# Patient Record
Sex: FEMALE | Race: WHITE
[De-identification: names, ages, dates, MRNs, and addresses within clinical notes are randomized per-mention and may not be internally consistent; named-entity substitution may affect disease eponyms.]

---

## 2017-01-12 ENCOUNTER — HOSPITAL ENCOUNTER (EMERGENCY)
Dept: HOSPITAL 47 - EC | Age: 77
Discharge: HOME | End: 2017-01-12
Payer: COMMERCIAL

## 2017-01-12 VITALS — DIASTOLIC BLOOD PRESSURE: 88 MMHG | RESPIRATION RATE: 16 BRPM | HEART RATE: 70 BPM | SYSTOLIC BLOOD PRESSURE: 153 MMHG

## 2017-01-12 VITALS — TEMPERATURE: 97.4 F

## 2017-01-12 DIAGNOSIS — I10: ICD-10-CM

## 2017-01-12 DIAGNOSIS — Z86.73: ICD-10-CM

## 2017-01-12 DIAGNOSIS — J45.909: ICD-10-CM

## 2017-01-12 DIAGNOSIS — I25.10: ICD-10-CM

## 2017-01-12 DIAGNOSIS — M32.9: ICD-10-CM

## 2017-01-12 DIAGNOSIS — J44.1: Primary | ICD-10-CM

## 2017-01-12 DIAGNOSIS — I48.91: ICD-10-CM

## 2017-01-12 DIAGNOSIS — Z88.0: ICD-10-CM

## 2017-01-12 DIAGNOSIS — Z88.5: ICD-10-CM

## 2017-01-12 DIAGNOSIS — Z91.040: ICD-10-CM

## 2017-01-12 DIAGNOSIS — E78.5: ICD-10-CM

## 2017-01-12 DIAGNOSIS — Z79.01: ICD-10-CM

## 2017-01-12 DIAGNOSIS — Z79.899: ICD-10-CM

## 2017-01-12 DIAGNOSIS — F41.9: ICD-10-CM

## 2017-01-12 DIAGNOSIS — E07.9: ICD-10-CM

## 2017-01-12 DIAGNOSIS — Z88.1: ICD-10-CM

## 2017-01-12 DIAGNOSIS — F32.9: ICD-10-CM

## 2017-01-12 DIAGNOSIS — I25.2: ICD-10-CM

## 2017-01-12 DIAGNOSIS — Z95.5: ICD-10-CM

## 2017-01-12 LAB
ALP SERPL-CCNC: 86 U/L (ref 38–126)
ALT SERPL-CCNC: 32 U/L (ref 9–52)
ANION GAP SERPL CALC-SCNC: 14 MMOL/L
AST SERPL-CCNC: 25 U/L (ref 14–36)
BASOPHILS # BLD AUTO: 0 K/UL (ref 0–0.2)
BASOPHILS NFR BLD AUTO: 0 %
BUN SERPL-SCNC: 15 MG/DL (ref 7–17)
CALCIUM SPEC-MCNC: 9.4 MG/DL (ref 8.4–10.2)
CH: 31.1
CHCM: 32.1
CHLORIDE SERPL-SCNC: 110 MMOL/L (ref 98–107)
CO2 BLDA-SCNC: 19 MMOL/L (ref 19–24)
CO2 SERPL-SCNC: 21 MMOL/L (ref 22–30)
EOSINOPHIL # BLD AUTO: 0.1 K/UL (ref 0–0.7)
EOSINOPHIL NFR BLD AUTO: 0 %
ERYTHROCYTE [DISTWIDTH] IN BLOOD BY AUTOMATED COUNT: 5.12 M/UL (ref 3.8–5.4)
ERYTHROCYTE [DISTWIDTH] IN BLOOD: 14.7 % (ref 11.5–15.5)
GLUCOSE SERPL-MCNC: 113 MG/DL (ref 74–99)
HCO3 BLDA-SCNC: 18 MMOL/L (ref 21–25)
HCT VFR BLD AUTO: 49.9 % (ref 34–46)
HDW: 2.54
HGB BLD-MCNC: 15.6 GM/DL (ref 11.4–16)
LUC NFR BLD AUTO: 1 %
LYMPHOCYTES # SPEC AUTO: 1.9 K/UL (ref 1–4.8)
LYMPHOCYTES NFR SPEC AUTO: 17 %
MAGNESIUM SPEC-SCNC: 1.9 MG/DL (ref 1.6–2.3)
MCH RBC QN AUTO: 30.4 PG (ref 25–35)
MCHC RBC AUTO-ENTMCNC: 31.3 G/DL (ref 31–37)
MCV RBC AUTO: 97.4 FL (ref 80–100)
MONOCYTES # BLD AUTO: 0.9 K/UL (ref 0–1)
MONOCYTES NFR BLD AUTO: 8 %
NEUTROPHILS # BLD AUTO: 8.1 K/UL (ref 1.3–7.7)
NEUTROPHILS NFR BLD AUTO: 73 %
NON-AFRICAN AMERICAN GFR(MDRD): 48
PCO2 BLDA: 28 MMHG (ref 35–45)
PH BLDA: 7.41 [PH] (ref 7.35–7.45)
PO2 BLDA: 95 MMHG (ref 83–108)
POTASSIUM SERPL-SCNC: 4 MMOL/L (ref 3.5–5.1)
PROT SERPL-MCNC: 6.6 G/DL (ref 6.3–8.2)
SODIUM SERPL-SCNC: 145 MMOL/L (ref 137–145)
WBC # BLD AUTO: 0.12 10*3/UL
WBC # BLD AUTO: 11.1 K/UL (ref 3.8–10.6)
WBC (PEROX): 11.35

## 2017-01-12 PROCEDURE — 99285 EMERGENCY DEPT VISIT HI MDM: CPT

## 2017-01-12 PROCEDURE — 83735 ASSAY OF MAGNESIUM: CPT

## 2017-01-12 PROCEDURE — 36415 COLL VENOUS BLD VENIPUNCTURE: CPT

## 2017-01-12 PROCEDURE — 85025 COMPLETE CBC W/AUTO DIFF WBC: CPT

## 2017-01-12 PROCEDURE — 83605 ASSAY OF LACTIC ACID: CPT

## 2017-01-12 PROCEDURE — 36600 WITHDRAWAL OF ARTERIAL BLOOD: CPT

## 2017-01-12 PROCEDURE — 80053 COMPREHEN METABOLIC PANEL: CPT

## 2017-01-12 PROCEDURE — 71010: CPT

## 2017-01-12 PROCEDURE — 84484 ASSAY OF TROPONIN QUANT: CPT

## 2017-01-12 PROCEDURE — 82805 BLOOD GASES W/O2 SATURATION: CPT

## 2017-01-12 NOTE — ED
Weakness HPI





- General


Chief complaint: Weakness


Stated complaint: Weakness


Time Seen by Provider: 17 13:11


Source: patient, RN notes reviewed


Mode of arrival: ambulatory


Limitations: no limitations





- History of Present Illness


Initial comments: 





Patient's a 76-year-old woman with history of COPD who presents to be evaluated 

for generalized fatigue, dyspnea, poor appetite, and her family also notes that 

she had been having very depressed mood over the prior couple of days.  They do 

acknowledge that her mood seems to have improved here.   The patient does admit 

some depression but denies suicidality.  Family was also concerned because at 

one point yesterday they had noted she was attempting to light a cigarette 

using a bottle And they were concerned she may been hallucinating.  She has not 

been hallucinating today.  Patient denies fever or chills.  She denies change 

in sputum.  Patient denies chest pain, change in urination, leg pain or 

swelling.


MD Complaint: generalized weakness, lack of energy, difficulty walking


-: days(s)


Location: generalized


Improves with: none


Worsens with: none


Associated Symptoms: loss of appetite, shortness of breath





- Related Data


 Home Medications











 Medication  Instructions  Recorded  Confirmed


 


Sertraline HCl [Zoloft] 200 mg PO DAILY 02/17/15 01/12/17


 


Levothyroxine Sodium [Synthroid] 75 mcg PO DAILY 04/28/15 01/12/17


 


Montelukast [Singulair] 10 mg PO HS 04/28/15 01/12/17


 


Acetaminophen Tab [Tylenol Tab] 500 mg PO Q4H PRN 05/05/15 01/12/17


 


Cyclobenzaprine [Flexeril] 10 mg PO HS 10/13/15 01/12/17


 


Metoprolol Tartrate [Lopressor] 25 mg PO BID 10/13/15 01/12/17


 


Sennosides-Docusate Sodium 1 tab PO HS PRN 10/13/15 01/12/17





[Senokot-S]   


 


Warfarin [Coumadin] 2.5 mg PO HS 10/13/15 01/12/17


 


Atorvastatin [Lipitor] 40 mg PO HS 16


 


Gabapentin [Neurontin] 1,200 mg PO BID 16


 


Ipratropium-Albuterol Nebulize 3 ml INHALATION QID PRN 16





[Duoneb 0.5 mg-3 mg/3 ml Soln]   


 


Multivitamins, Thera [Multivitamin] 1 tab PO DAILY 16


 


Nitroglycerin Sl Tabs [Nitrostat] 0.4 mg SUBLINGUAL Q5M PRN 16








 Previous Rx's











 Medication  Instructions  Recorded


 


predniSONE 60 mg PO DAILY #30 tab 17











 Allergies











Allergy/AdvReac Type Severity Reaction Status Date / Time


 


iodine Allergy Severe throat Verified 17 13:37





   Swelling  


 


latex Allergy Severe throat Verified 17 13:37





   swelling  


 


codeine Allergy  Unknown Verified 17 13:37


 


Penicillins Allergy  Rash/Hives Verified 17 13:37


 


doxycycline AdvReac  Nausea & Verified 17 13:37





   Vomiting  














Review of Systems


ROS Statement: 


Those systems with pertinent positive or pertinent negative responses have been 

documented in the HPI.





ROS Other: All systems not noted in ROS Statement are negative.


Constitutional: Reports: weakness (Generalized).  Denies: fever, chills


Respiratory: Reports: cough, dyspnea, wheezes.  Denies: hemoptysis


Cardiovascular: Reports: dyspnea on exertion.  Denies: chest pain, palpitations

, orthopnea, edema, syncope


Gastrointestinal: Denies: abdominal pain, nausea, vomiting, melena


Genitourinary: Denies: dysuria, hematuria


Musculoskeletal: Denies: back pain


Skin: Denies: rash


Neurological: Reports: as per HPI, weakness (Generalized), confusion.  Denies: 

headache, numbness





Past Medical History


Past Medical History: Atrial Fibrillation, Asthma, Coronary Artery Disease (CAD)

, Chest Pain / Angina, COPD, CVA/TIA, Hyperlipidemia, Hypertension, Myocardial 

Infarction (MI), Osteoarthritis (OA), Thyroid Disorder, Vascular Disorder


Additional Past Medical History / Comment(s): LUPUS , RIGHT SIDED WEAKNESS and 

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 

she gets "black out" spells and often falls


Last Myocardial Infarction Date:: 2012


History of Any Multi-Drug Resistant Organisms: None Reported


Past Surgical History: Appendectomy, Heart Catheterization With Stent, 

Orthopedic Surgery, Tonsillectomy


Additional Past Surgical History / Comment(s): 3 cardiac stents, PTCA   tremaine 

carotid endarterectomy, laparoscopy-adhesions removed. tremaine arthroscopic knee 

surgery, bilateral cataract removal,


Past Anesthesia/Blood Transfusion Reactions: No Reported Reaction


Additional Past Anesthesia/Blood Transfusion Reaction / Comment(s): .


Date of Last Stent Placement:: (not sure month)


Past Psychological History: Anxiety


Additional Psychological History / Comment(s): .


Smoking Status: Current some day smoker


Past Alcohol Use History: None Reported


Additional Past Alcohol Use History / Comment(s): started smoking age 30


Past Drug Use History: None Reported





- Past Family History


  ** Father


Family Medical History: Cancer


Additional Family Medical History / Comment(s): Father had massive MI at age 66 

yrs.  He  from CVA at age 79yrs.





  ** Mother


Family Medical History: CVA/TIA, Deep Vein Thrombosis (DVT), Pulmonary Embolus


Additional Family Medical History / Comment(s): Mother  of CVA at age 79 

yrs.





General Exam


Limitations: no limitations


General appearance: alert, in no apparent distress, cachectic


Head exam: Present: atraumatic, normocephalic


Eye exam: Present: normal appearance.  Absent: scleral icterus, conjunctival 

injection


ENT exam: Present: mucous membranes dry


Neck exam: Present: normal inspection


Respiratory exam: Present: wheezes, decreased breath sounds.  Absent: 

respiratory distress, rales, rhonchi, stridor, chest wall tenderness


Cardiovascular Exam: Present: regular rate, normal rhythm, normal heart sounds.

  Absent: systolic murmur, diastolic murmur, rubs, gallop


GI/Abdominal exam: Present: soft.  Absent: distended, tenderness, guarding, 

rebound


Extremities exam: Present: normal inspection, normal capillary refill.  Absent: 

pedal edema, calf tenderness


Back exam: Present: normal inspection.  Absent: CVA tenderness (R), CVA 

tenderness (L)


Neurological exam: Present: alert, oriented X3, CN II-XII intact, normal gait.  

Absent: motor sensory deficit


Psychiatric exam: Present: normal affect


Skin exam: Present: warm, dry, intact.  Absent: cyanosis, diaphoretic, pallor





Course


 Vital Signs











  17





  12:59 16:05


 


Temperature 97.4 F L 


 


Pulse Rate 50 L 70


 


Respiratory 18 16





Rate  


 


Blood Pressure 136/82 153/88


 


O2 Sat by Pulse 100 93 L





Oximetry  














EKG Findings





- EKG Results:


EKG: interpreted by ERMD, sinus rhythm, normal axis, normal QRS, normal ST/T





Medical Decision Making





- Medical Decision Making





Patient is a 76-year-old woman who does appear to be having COPD exacerbation.  

She had treatments and steroids here.  The patient was then feeling better.  I 

discussed admission, given her second visit this month.  The patient states 

that her preference is to go home.  I am concerned about how she is doing but 

will defer at this point to her request to go home, I did phone the 

pulmonologist and she can be seen at 8:30 in the morning.  Discussed return 

parameters and the patient will return should her symptoms recur or if there is 

any worsening in anyway.





- Lab Data


Result diagrams: 


 17 12:41





 17 12:41


 Lab Results











  17 Range/Units





  12:41 12:41 12:41 


 


WBC  11.1 H    (3.8-10.6)  k/uL


 


RBC  5.12    (3.80-5.40)  m/uL


 


Hgb  15.6    (11.4-16.0)  gm/dL


 


Hct  49.9 H    (34.0-46.0)  %


 


MCV  97.4    (80.0-100.0)  fL


 


MCH  30.4    (25.0-35.0)  pg


 


MCHC  31.3    (31.0-37.0)  g/dL


 


RDW  14.7    (11.5-15.5)  %


 


Plt Count  365    (150-450)  k/uL


 


Neutrophils %  73    %


 


Lymphocytes %  17    %


 


Monocytes %  8    %


 


Eosinophils %  0    %


 


Basophils %  0    %


 


Neutrophils #  8.1 H    (1.3-7.7)  k/uL


 


Lymphocytes #  1.9    (1.0-4.8)  k/uL


 


Monocytes #  0.9    (0-1.0)  k/uL


 


Eosinophils #  0.1    (0-0.7)  k/uL


 


Basophils #  0.0    (0-0.2)  k/uL


 


Sample Site     


 


ABG pH     (7.35-7.45)  


 


ABG pCO2     (35-45)  mmHg


 


ABG pO2     ()  mmHg


 


ABG HCO3     (21-25)  mmol/L


 


ABG Total CO2     (19-24)  mmol/L


 


ABG O2 Saturation     (94-97)  %


 


ABG Base Excess     mmol/L


 


FiO2     %


 


Sodium   145   (137-145)  mmol/L


 


Potassium   4.0   (3.5-5.1)  mmol/L


 


Chloride   110 H   ()  mmol/L


 


Carbon Dioxide   21 L   (22-30)  mmol/L


 


Anion Gap   14   mmol/L


 


BUN   15   (7-17)  mg/dL


 


Creatinine   1.11 H   (0.52-1.04)  mg/dL


 


Est GFR (MDRD) Af Amer   58   (>60 ml/min/1.73 sqM)  


 


Est GFR (MDRD) Non-Af   48   (>60 ml/min/1.73 sqM)  


 


Glucose   113 H   (74-99)  mg/dL


 


Plasma Lactic Acid Dudley    1.2  (0.7-2.0)  mmol/L


 


Calcium   9.4   (8.4-10.2)  mg/dL


 


Magnesium   1.9   (1.6-2.3)  mg/dL


 


Total Bilirubin   0.6   (0.2-1.3)  mg/dL


 


AST   25   (14-36)  U/L


 


ALT   32   (9-52)  U/L


 


Alkaline Phosphatase   86   ()  U/L


 


Troponin I     (0.000-0.034)  ng/mL


 


Total Protein   6.6   (6.3-8.2)  g/dL


 


Albumin   3.6   (3.5-5.0)  g/dL














  17 Range/Units





  12:41 14:56 


 


WBC    (3.8-10.6)  k/uL


 


RBC    (3.80-5.40)  m/uL


 


Hgb    (11.4-16.0)  gm/dL


 


Hct    (34.0-46.0)  %


 


MCV    (80.0-100.0)  fL


 


MCH    (25.0-35.0)  pg


 


MCHC    (31.0-37.0)  g/dL


 


RDW    (11.5-15.5)  %


 


Plt Count    (150-450)  k/uL


 


Neutrophils %    %


 


Lymphocytes %    %


 


Monocytes %    %


 


Eosinophils %    %


 


Basophils %    %


 


Neutrophils #    (1.3-7.7)  k/uL


 


Lymphocytes #    (1.0-4.8)  k/uL


 


Monocytes #    (0-1.0)  k/uL


 


Eosinophils #    (0-0.7)  k/uL


 


Basophils #    (0-0.2)  k/uL


 


Sample Site   LR[facility]  


 


ABG pH   7.41  (7.35-7.45)  


 


ABG pCO2   28 L  (35-45)  mmHg


 


ABG pO2   95  ()  mmHg


 


ABG HCO3   18 L  (21-25)  mmol/L


 


ABG Total CO2   19  (19-24)  mmol/L


 


ABG O2 Saturation   98.0 H  (94-97)  %


 


ABG Base Excess   -5.9  mmol/L


 


FiO2   28  %


 


Sodium    (137-145)  mmol/L


 


Potassium    (3.5-5.1)  mmol/L


 


Chloride    ()  mmol/L


 


Carbon Dioxide    (22-30)  mmol/L


 


Anion Gap    mmol/L


 


BUN    (7-17)  mg/dL


 


Creatinine    (0.52-1.04)  mg/dL


 


Est GFR (MDRD) Af Amer    (>60 ml/min/1.73 sqM)  


 


Est GFR (MDRD) Non-Af    (>60 ml/min/1.73 sqM)  


 


Glucose    (74-99)  mg/dL


 


Plasma Lactic Acid Dudley    (0.7-2.0)  mmol/L


 


Calcium    (8.4-10.2)  mg/dL


 


Magnesium    (1.6-2.3)  mg/dL


 


Total Bilirubin    (0.2-1.3)  mg/dL


 


AST    (14-36)  U/L


 


ALT    (9-52)  U/L


 


Alkaline Phosphatase    ()  U/L


 


Troponin I  <0.012   (0.000-0.034)  ng/mL


 


Total Protein    (6.3-8.2)  g/dL


 


Albumin    (3.5-5.0)  g/dL














Disposition


Clinical Impression: 


 COPD exacerbation





Disposition: HOME SELF-CARE


Condition: Fair


Instructions:  COPD (Chronic Obstructive Pulmonary Disease) (ED)


Additional Instructions: 


As we discussed, follow-up in the clinic tomorrow.  Dr. Hernández will see you at 8:

30 AM.  Return here immediately if there is any worsening or change in her 

condition.


Prescriptions: 


predniSONE 60 mg PO DAILY #30 tab


Referrals: 


Russell Cortes MD [Primary Care Provider] - 1-2 days

## 2017-04-14 ENCOUNTER — HOSPITAL ENCOUNTER (OUTPATIENT)
Dept: HOSPITAL 47 - RADMAMWWP | Age: 77
Discharge: HOME | End: 2017-04-14
Payer: MEDICARE

## 2017-04-14 DIAGNOSIS — Z12.31: Primary | ICD-10-CM

## 2017-04-14 PROCEDURE — 77063 BREAST TOMOSYNTHESIS BI: CPT

## 2017-04-18 NOTE — MM
Reason for exam: screening  (asymptomatic).

Last mammogram was performed 1 year and 4 months ago.



History:

Patient is postmenopausal and has history of other cancer at age 72.

Family history of breast cancer in paternal aunt at age 40.

Took estrogen for 6 years 6 months.  Took progesterone for 6 years 6 months.



Physical Findings:

A clinical breast exam by your physician is recommended on an annual basis and 

results should be correlated with mammographic findings.



MG 3D Screening Mammo W/Cad

Bilateral CC and MLO view(s) were taken.

Prior study comparison: December 21, 2015, bilateral MG screening mammo w CAD.  

March 26, 2012, bilateral digital screening mammo w/CAD.

No significant changes when compared with prior studies.





ASSESSMENT: Benign, BI-RAD 2



RECOMMENDATION:

Routine screening mammogram of both breasts in 1 year.

Manage patient on a clinical basis regarding pain.

## 2017-06-17 ENCOUNTER — HOSPITAL ENCOUNTER (EMERGENCY)
Dept: HOSPITAL 47 - EC | Age: 77
Discharge: HOME | End: 2017-06-17
Payer: MEDICARE

## 2017-06-17 VITALS — RESPIRATION RATE: 16 BRPM | DIASTOLIC BLOOD PRESSURE: 69 MMHG | SYSTOLIC BLOOD PRESSURE: 143 MMHG

## 2017-06-17 VITALS — TEMPERATURE: 97 F | HEART RATE: 70 BPM

## 2017-06-17 DIAGNOSIS — E07.9: ICD-10-CM

## 2017-06-17 DIAGNOSIS — Z91.040: ICD-10-CM

## 2017-06-17 DIAGNOSIS — F17.200: ICD-10-CM

## 2017-06-17 DIAGNOSIS — R00.1: ICD-10-CM

## 2017-06-17 DIAGNOSIS — I25.10: ICD-10-CM

## 2017-06-17 DIAGNOSIS — Z88.0: ICD-10-CM

## 2017-06-17 DIAGNOSIS — E78.5: ICD-10-CM

## 2017-06-17 DIAGNOSIS — Z88.5: ICD-10-CM

## 2017-06-17 DIAGNOSIS — Z91.048: ICD-10-CM

## 2017-06-17 DIAGNOSIS — J45.909: Primary | ICD-10-CM

## 2017-06-17 DIAGNOSIS — F41.9: ICD-10-CM

## 2017-06-17 DIAGNOSIS — I10: ICD-10-CM

## 2017-06-17 DIAGNOSIS — I48.91: ICD-10-CM

## 2017-06-17 DIAGNOSIS — Z88.1: ICD-10-CM

## 2017-06-17 DIAGNOSIS — H92.09: ICD-10-CM

## 2017-06-17 DIAGNOSIS — Z79.899: ICD-10-CM

## 2017-06-17 DIAGNOSIS — Z86.73: ICD-10-CM

## 2017-06-17 DIAGNOSIS — Z79.82: ICD-10-CM

## 2017-06-17 DIAGNOSIS — I25.2: ICD-10-CM

## 2017-06-17 DIAGNOSIS — Z95.5: ICD-10-CM

## 2017-06-17 DIAGNOSIS — Z79.01: ICD-10-CM

## 2017-06-17 LAB
ALP SERPL-CCNC: 94 U/L (ref 38–126)
ALT SERPL-CCNC: 25 U/L (ref 9–52)
ANION GAP SERPL CALC-SCNC: 10 MMOL/L
AST SERPL-CCNC: 25 U/L (ref 14–36)
BASOPHILS # BLD AUTO: 0 K/UL (ref 0–0.2)
BASOPHILS NFR BLD AUTO: 0 %
BUN SERPL-SCNC: 14 MG/DL (ref 7–17)
CALCIUM SPEC-MCNC: 8.8 MG/DL (ref 8.4–10.2)
CH: 30.8
CHCM: 32
CHLORIDE SERPL-SCNC: 106 MMOL/L (ref 98–107)
CO2 SERPL-SCNC: 24 MMOL/L (ref 22–30)
EOSINOPHIL # BLD AUTO: 0.1 K/UL (ref 0–0.7)
EOSINOPHIL NFR BLD AUTO: 1 %
ERYTHROCYTE [DISTWIDTH] IN BLOOD BY AUTOMATED COUNT: 4.16 M/UL (ref 3.8–5.4)
ERYTHROCYTE [DISTWIDTH] IN BLOOD: 14.8 % (ref 11.5–15.5)
GLUCOSE SERPL-MCNC: 82 MG/DL (ref 74–99)
HCT VFR BLD AUTO: 40.3 % (ref 34–46)
HDW: 2.47
HGB BLD-MCNC: 12.9 GM/DL (ref 11.4–16)
LUC NFR BLD AUTO: 2 %
LYMPHOCYTES # SPEC AUTO: 2.1 K/UL (ref 1–4.8)
LYMPHOCYTES NFR SPEC AUTO: 25 %
MCH RBC QN AUTO: 31 PG (ref 25–35)
MCHC RBC AUTO-ENTMCNC: 32 G/DL (ref 31–37)
MCV RBC AUTO: 96.9 FL (ref 80–100)
MONOCYTES # BLD AUTO: 0.7 K/UL (ref 0–1)
MONOCYTES NFR BLD AUTO: 8 %
NEUTROPHILS # BLD AUTO: 5.3 K/UL (ref 1.3–7.7)
NEUTROPHILS NFR BLD AUTO: 64 %
NON-AFRICAN AMERICAN GFR(MDRD): 58
POTASSIUM SERPL-SCNC: 4.5 MMOL/L (ref 3.5–5.1)
PROT SERPL-MCNC: 6.6 G/DL (ref 6.3–8.2)
SODIUM SERPL-SCNC: 140 MMOL/L (ref 137–145)
WBC # BLD AUTO: 0.13 10*3/UL
WBC # BLD AUTO: 8.3 K/UL (ref 3.8–10.6)
WBC (PEROX): 8.66

## 2017-06-17 PROCEDURE — 87040 BLOOD CULTURE FOR BACTERIA: CPT

## 2017-06-17 PROCEDURE — 94640 AIRWAY INHALATION TREATMENT: CPT

## 2017-06-17 PROCEDURE — 80053 COMPREHEN METABOLIC PANEL: CPT

## 2017-06-17 PROCEDURE — 99284 EMERGENCY DEPT VISIT MOD MDM: CPT

## 2017-06-17 PROCEDURE — 71020: CPT

## 2017-06-17 PROCEDURE — 85025 COMPLETE CBC W/AUTO DIFF WBC: CPT

## 2017-06-17 PROCEDURE — 96365 THER/PROPH/DIAG IV INF INIT: CPT

## 2017-06-17 PROCEDURE — 36415 COLL VENOUS BLD VENIPUNCTURE: CPT

## 2017-06-17 PROCEDURE — 96375 TX/PRO/DX INJ NEW DRUG ADDON: CPT

## 2017-06-17 PROCEDURE — 93005 ELECTROCARDIOGRAM TRACING: CPT

## 2017-06-17 NOTE — XR
EXAMINATION TYPE: XR chest 2V

 

DATE OF EXAM: 6/17/2017

 

COMPARISON: 1/12/2017

 

TECHNIQUE: PA and lateral views submitted.

 

HISTORY: Difficulty in breathing

 

FINDINGS:

The lungs are clear and  there is no pneumothorax, pleural effusion, or focal pneumonia.  Hyperinflat
ion suggests COPD and there is arthropathy of the shoulders. Degenerative change of the spine. Compre
ssion deformity with probable previous vertebroplasty noted. Suggestive coronary artery stenting.

 

IMPRESSION: 

1. No acute process.

## 2017-06-17 NOTE — ED
General Adult HPI





- General


Chief complaint: Recheck/Abnormal Lab/Rx


Stated complaint: bronchitis


Time Seen by Provider: 17 11:32


Source: patient


Mode of arrival: wheelchair


Limitations: no limitations





- History of Present Illness


Initial comments: 





76-year-old female presents with cough.  She's had a cough for the last several 

weeks.  History of COPD has been wheezing.  Was treated about 4 weeks ago with 

Bactrim, is not sure she's had other antibiotics.  She has no shortness of 

breath has a nebulizer but has not been using it.  Has had mild ear discomfort 

no sore throat bringing up some phlegm.  No chest pain.  Has had a history of 

coronary disease with stents she's had a autoimmune disease no history diabetes 

seizures.  She's had stroke with carotid bypass in the past





- Related Data


 Home Medications











 Medication  Instructions  Recorded  Confirmed


 


Sertraline HCl [Zoloft] 200 mg PO DAILY 02/17/15 06/17/17


 


Levothyroxine Sodium [Synthroid] 75 mcg PO DAILY 04/28/15 06/17/17


 


Montelukast [Singulair] 10 mg PO HS 04/28/15 06/17/17


 


Acetaminophen Tab [Tylenol Tab] 500 mg PO Q4H PRN 05/05/15 06/17/17


 


Metoprolol Tartrate [Lopressor] 12.5 mg PO BID 10/13/15 06/17/17


 


Warfarin [Coumadin] 2.5 mg PO HS 10/13/15 06/17/17


 


Atorvastatin [Lipitor] 40 mg PO HS 16


 


Gabapentin [Neurontin] 1,200 mg PO BID 16


 


Nitroglycerin Sl Tabs [Nitrostat] 0.4 mg SUBLINGUAL Q5M PRN 16


 


ALPRAZolam [Xanax] 1 mg PO Q8HR PRN 17


 


Albuterol Nebulized [Ventolin 2.5 mg INHALATION RT-QID PRN 17





Nebulized]   


 


Aspirin EC [Ecotrin Low Dose] 81 mg PO DAILY 17


 


Baclofen 10 mg PO TID PRN 17








 Previous Rx's











 Medication  Instructions  Recorded


 


Levofloxacin [Levaquin] 500 mg PO DAILY #5 tab 17


 


predniSONE 10 mg PO DAILY #5 tab 17











 Allergies











Allergy/AdvReac Type Severity Reaction Status Date / Time


 


iodine Allergy Severe throat Verified 17 11:10





   Swelling  


 


latex Allergy Severe throat Verified 17 11:10





   swelling  


 


codeine Allergy  Nausea & Verified 17 12:10





   Vomiting  


 


Penicillins Allergy  Rash/Hives Verified 17 11:10


 


doxycycline AdvReac  Nausea & Verified 17 11:10





   Vomiting  














Review of Systems


ROS Statement: 


Those systems with pertinent positive or pertinent negative responses have been 

documented in the HPI.





ROS Other: All systems not noted in ROS Statement are negative.


Constitutional: Denies: fever, chills


ENT: Reports: ear pain.  Denies: throat pain


Respiratory: Reports: cough, wheezes


Cardiovascular: Denies: chest pain, palpitations


Gastrointestinal: Denies: abdominal pain, nausea, vomiting


Skin: Denies: rash


Neurological: Denies: headache, weakness


Hematological/Lymphatic: Denies: easy bleeding, easy bruising





Past Medical History


Past Medical History: Atrial Fibrillation, Asthma, Coronary Artery Disease (CAD)

, Chest Pain / Angina, COPD, CVA/TIA, Hyperlipidemia, Hypertension, Myocardial 

Infarction (MI), Osteoarthritis (OA), Thyroid Disorder, Vascular Disorder


Additional Past Medical History / Comment(s): LUPUS , RIGHT SIDED WEAKNESS and 

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 

she gets "black out" spells and often falls


Last Myocardial Infarction Date:: 2012


History of Any Multi-Drug Resistant Organisms: None Reported


Past Surgical History: Appendectomy, Heart Catheterization With Stent, 

Orthopedic Surgery, Tonsillectomy


Additional Past Surgical History / Comment(s): 3 cardiac stents, PTCA   tremaine 

carotid endarterectomy, laparoscopy-adhesions removed. tremaine arthroscopic knee 

surgery, bilateral cataract removal,


Past Anesthesia/Blood Transfusion Reactions: No Reported Reaction


Additional Past Anesthesia/Blood Transfusion Reaction / Comment(s): .


Date of Last Stent Placement:: (not sure month)


Past Psychological History: Anxiety


Smoking Status: Current some day smoker


Past Alcohol Use History: None Reported


Past Drug Use History: None Reported





- Past Family History


  ** Father


Family Medical History: Cancer


Additional Family Medical History / Comment(s): Father had massive MI at age 66 

yrs.  He  from CVA at age 79yrs.





  ** Mother


Family Medical History: CVA/TIA, Deep Vein Thrombosis (DVT), Pulmonary Embolus


Additional Family Medical History / Comment(s): Mother  of CVA at age 79 

yrs.





General Exam


Limitations: no limitations


General appearance: alert, in no apparent distress


Head exam: Present: atraumatic


Eye exam: Present: normal appearance, PERRL, EOMI


ENT exam: Present: normal oropharynx, mucous membranes moist


Neck exam: Present: normal inspection


Respiratory exam: Present: wheezes (Mild bilaterally heard better anteriorly)


Cardiovascular Exam: Present: regular rate, normal heart sounds


GI/Abdominal exam: Present: soft.  Absent: tenderness


Neurological exam: Present: alert, CN II-XII intact


Psychiatric exam: Present: normal affect, normal mood


Skin exam: Present: warm, dry





Course


 Vital Signs











  17





  11:07 12:36 12:54


 


Temperature 97.0 F L 97.4 F L 


 


Pulse Rate 69 58 L 53 L


 


Respiratory 20 18 





Rate   


 


Blood Pressure  146/68 


 


O2 Sat by Pulse 98 95 





Oximetry   














  17





  13:00 14:25


 


Temperature  98 F


 


Pulse Rate 58 L 65


 


Respiratory  16





Rate  


 


Blood Pressure  143/69


 


O2 Sat by Pulse  98





Oximetry  














Medical Decision Making





- Medical Decision Making





Chest x-ray is negative white count is satisfactory lab is satisfactory we'll 

treat his asthmatic bronchitis we'll give one dose of Rocephin in IV steroid.  

Sent home with Levaquin and steroids.  PT/INR was recently checked at the doctor

's office was satisfactory she missed work from last night we have explained 

cannot take the warfarin while she was using Levaquin and to follow-up with Dr. Cortes next couple days.





- Lab Data


Result diagrams: 


 17 12:32





 17 12:32


 Lab Results











  17 Range/Units





  12:32 12:32 


 


WBC  8.3   (3.8-10.6)  k/uL


 


RBC  4.16   (3.80-5.40)  m/uL


 


Hgb  12.9   (11.4-16.0)  gm/dL


 


Hct  40.3   (34.0-46.0)  %


 


MCV  96.9   (80.0-100.0)  fL


 


MCH  31.0   (25.0-35.0)  pg


 


MCHC  32.0   (31.0-37.0)  g/dL


 


RDW  14.8   (11.5-15.5)  %


 


Plt Count  237   (150-450)  k/uL


 


Neutrophils %  64   %


 


Lymphocytes %  25   %


 


Monocytes %  8   %


 


Eosinophils %  1   %


 


Basophils %  0   %


 


Neutrophils #  5.3   (1.3-7.7)  k/uL


 


Lymphocytes #  2.1   (1.0-4.8)  k/uL


 


Monocytes #  0.7   (0-1.0)  k/uL


 


Eosinophils #  0.1   (0-0.7)  k/uL


 


Basophils #  0.0   (0-0.2)  k/uL


 


Sodium   140  (137-145)  mmol/L


 


Potassium   4.5  (3.5-5.1)  mmol/L


 


Chloride   106  ()  mmol/L


 


Carbon Dioxide   24  (22-30)  mmol/L


 


Anion Gap   10  mmol/L


 


BUN   14  (7-17)  mg/dL


 


Creatinine   0.94  (0.52-1.04)  mg/dL


 


Est GFR (MDRD) Af Amer   >60  (>60 ml/min/1.73 sqM)  


 


Est GFR (MDRD) Non-Af   58  (>60 ml/min/1.73 sqM)  


 


Glucose   82  (74-99)  mg/dL


 


Calcium   8.8  (8.4-10.2)  mg/dL


 


Total Bilirubin   0.6  (0.2-1.3)  mg/dL


 


AST   25  (14-36)  U/L


 


ALT   25  (9-52)  U/L


 


Alkaline Phosphatase   94  ()  U/L


 


Total Protein   6.6  (6.3-8.2)  g/dL


 


Albumin   3.8  (3.5-5.0)  g/dL














- EKG Data


-: EKG Interpreted by Me





17 12:28


EKG 2017 1219 ventricular rate 50 bpm, OK interval 164 ms, QRS duration 

72 ms,  ms sinus bradycardia possible left atrial enlargement and 

nonspecific ST-T abnormality poor baseline in V2 and V5





- Radiology Data


Radiology results: report reviewed


No acute changes on chest x-ray





Disposition


Clinical Impression: 


 Asthmatic bronchitis





Disposition: HOME SELF-CARE


Condition: Good


Instructions:  Bronchospasm (ED)


Additional Instructions: 


Hold Coumadin for 5 days


Prescriptions: 


Levofloxacin [Levaquin] 500 mg PO DAILY #5 tab


predniSONE 10 mg PO DAILY #5 tab


Referrals: 


Russell Cortes MD [Primary Care Provider] - 1-2 days


Time of Disposition: 14:42

## 2017-07-01 ENCOUNTER — HOSPITAL ENCOUNTER (EMERGENCY)
Dept: HOSPITAL 47 - EC | Age: 77
Discharge: HOME | End: 2017-07-01
Payer: MEDICARE

## 2017-07-01 VITALS
RESPIRATION RATE: 20 BRPM | DIASTOLIC BLOOD PRESSURE: 74 MMHG | TEMPERATURE: 98.1 F | HEART RATE: 54 BPM | SYSTOLIC BLOOD PRESSURE: 147 MMHG

## 2017-07-01 DIAGNOSIS — Z91.040: ICD-10-CM

## 2017-07-01 DIAGNOSIS — E86.0: ICD-10-CM

## 2017-07-01 DIAGNOSIS — E07.9: ICD-10-CM

## 2017-07-01 DIAGNOSIS — I25.2: ICD-10-CM

## 2017-07-01 DIAGNOSIS — Z79.01: ICD-10-CM

## 2017-07-01 DIAGNOSIS — R51: ICD-10-CM

## 2017-07-01 DIAGNOSIS — J06.9: Primary | ICD-10-CM

## 2017-07-01 DIAGNOSIS — I48.91: ICD-10-CM

## 2017-07-01 DIAGNOSIS — I25.10: ICD-10-CM

## 2017-07-01 DIAGNOSIS — Z88.0: ICD-10-CM

## 2017-07-01 DIAGNOSIS — M19.90: ICD-10-CM

## 2017-07-01 DIAGNOSIS — Z88.8: ICD-10-CM

## 2017-07-01 DIAGNOSIS — Z88.1: ICD-10-CM

## 2017-07-01 DIAGNOSIS — F17.200: ICD-10-CM

## 2017-07-01 DIAGNOSIS — J44.9: ICD-10-CM

## 2017-07-01 DIAGNOSIS — Z79.899: ICD-10-CM

## 2017-07-01 DIAGNOSIS — Z88.5: ICD-10-CM

## 2017-07-01 DIAGNOSIS — Z79.82: ICD-10-CM

## 2017-07-01 DIAGNOSIS — Z86.73: ICD-10-CM

## 2017-07-01 DIAGNOSIS — F41.9: ICD-10-CM

## 2017-07-01 DIAGNOSIS — J45.909: ICD-10-CM

## 2017-07-01 DIAGNOSIS — E78.5: ICD-10-CM

## 2017-07-01 DIAGNOSIS — I10: ICD-10-CM

## 2017-07-01 LAB
ALP SERPL-CCNC: 87 U/L (ref 38–126)
ALT SERPL-CCNC: 29 U/L (ref 9–52)
ANION GAP SERPL CALC-SCNC: 12 MMOL/L
APTT BLD: 23.9 SEC (ref 22–30)
AST SERPL-CCNC: 22 U/L (ref 14–36)
BASOPHILS # BLD AUTO: 0 K/UL (ref 0–0.2)
BASOPHILS NFR BLD AUTO: 0 %
BUN SERPL-SCNC: 26 MG/DL (ref 7–17)
CALCIUM SPEC-MCNC: 9.1 MG/DL (ref 8.4–10.2)
CH: 30.4
CHCM: 33.6
CHLORIDE SERPL-SCNC: 101 MMOL/L (ref 98–107)
CK SERPL-CCNC: 30 U/L (ref 30–135)
CO2 SERPL-SCNC: 22 MMOL/L (ref 22–30)
EOSINOPHIL # BLD AUTO: 0.1 K/UL (ref 0–0.7)
EOSINOPHIL NFR BLD AUTO: 1 %
ERYTHROCYTE [DISTWIDTH] IN BLOOD BY AUTOMATED COUNT: 4.39 M/UL (ref 3.8–5.4)
ERYTHROCYTE [DISTWIDTH] IN BLOOD: 14.9 % (ref 11.5–15.5)
GLUCOSE SERPL-MCNC: 85 MG/DL (ref 74–99)
HCT VFR BLD AUTO: 40.1 % (ref 34–46)
HDW: 2.55
HGB BLD-MCNC: 13.4 GM/DL (ref 11.4–16)
INR PPP: 1.1 (ref ?–1.1)
LUC NFR BLD AUTO: 2 %
LYMPHOCYTES # SPEC AUTO: 2.9 K/UL (ref 1–4.8)
LYMPHOCYTES NFR SPEC AUTO: 23 %
MAGNESIUM SPEC-SCNC: 2.1 MG/DL (ref 1.6–2.3)
MCH RBC QN AUTO: 30.6 PG (ref 25–35)
MCHC RBC AUTO-ENTMCNC: 33.5 G/DL (ref 31–37)
MCV RBC AUTO: 91.3 FL (ref 80–100)
MONOCYTES # BLD AUTO: 0.9 K/UL (ref 0–1)
MONOCYTES NFR BLD AUTO: 7 %
NEUTROPHILS # BLD AUTO: 8.5 K/UL (ref 1.3–7.7)
NEUTROPHILS NFR BLD AUTO: 68 %
NON-AFRICAN AMERICAN GFR(MDRD): 37
PARTICLE COUNT: 2027
PH UR: 6.5 [PH] (ref 5–8)
POTASSIUM SERPL-SCNC: 3.7 MMOL/L (ref 3.5–5.1)
PROT SERPL-MCNC: 6.2 G/DL (ref 6.3–8.2)
PT BLD: 11.1 SEC (ref 9–12)
RBC UR QL: 1 /HPF (ref 0–5)
SODIUM SERPL-SCNC: 135 MMOL/L (ref 137–145)
SP GR UR: 1.01 (ref 1–1.03)
SQUAMOUS UR QL AUTO: 3 /HPF (ref 0–4)
TROPONIN I SERPL-MCNC: <0.012 NG/ML (ref 0–0.03)
UA BILLING (MACRO VS. MICRO): (no result)
UROBILINOGEN UR QL STRIP: <2 MG/DL (ref ?–2)
WBC # BLD AUTO: 0.18 10*3/UL
WBC # BLD AUTO: 12.5 K/UL (ref 3.8–10.6)
WBC #/AREA URNS HPF: 2 /HPF (ref 0–5)
WBC (PEROX): 12.46

## 2017-07-01 PROCEDURE — 80053 COMPREHEN METABOLIC PANEL: CPT

## 2017-07-01 PROCEDURE — 96361 HYDRATE IV INFUSION ADD-ON: CPT

## 2017-07-01 PROCEDURE — 84484 ASSAY OF TROPONIN QUANT: CPT

## 2017-07-01 PROCEDURE — 82553 CREATINE MB FRACTION: CPT

## 2017-07-01 PROCEDURE — 82550 ASSAY OF CK (CPK): CPT

## 2017-07-01 PROCEDURE — 36415 COLL VENOUS BLD VENIPUNCTURE: CPT

## 2017-07-01 PROCEDURE — 85610 PROTHROMBIN TIME: CPT

## 2017-07-01 PROCEDURE — 85379 FIBRIN DEGRADATION QUANT: CPT

## 2017-07-01 PROCEDURE — 78582 LUNG VENTILAT&PERFUS IMAGING: CPT

## 2017-07-01 PROCEDURE — 83880 ASSAY OF NATRIURETIC PEPTIDE: CPT

## 2017-07-01 PROCEDURE — 71020: CPT

## 2017-07-01 PROCEDURE — 81001 URINALYSIS AUTO W/SCOPE: CPT

## 2017-07-01 PROCEDURE — 83605 ASSAY OF LACTIC ACID: CPT

## 2017-07-01 PROCEDURE — 93005 ELECTROCARDIOGRAM TRACING: CPT

## 2017-07-01 PROCEDURE — 87040 BLOOD CULTURE FOR BACTERIA: CPT

## 2017-07-01 PROCEDURE — 96360 HYDRATION IV INFUSION INIT: CPT

## 2017-07-01 PROCEDURE — 85025 COMPLETE CBC W/AUTO DIFF WBC: CPT

## 2017-07-01 PROCEDURE — 85730 THROMBOPLASTIN TIME PARTIAL: CPT

## 2017-07-01 PROCEDURE — 83735 ASSAY OF MAGNESIUM: CPT

## 2017-07-01 PROCEDURE — 70450 CT HEAD/BRAIN W/O DYE: CPT

## 2017-07-01 PROCEDURE — 99284 EMERGENCY DEPT VISIT MOD MDM: CPT

## 2017-07-01 NOTE — NM
EXAMINATION TYPE: NM pul vent and perfuse

 

DATE OF EXAM: 7/1/2017

 

COMPARISON: Chest x-ray 7/1/2017

 

HISTORY: Elevated d-dimer

 

TECHNIQUE:  Utilizing inhalation of 71.1 mCi Tc 99m DTPA aerosol and intravenous injection of 5.4 mCi
 of Tc 99m MAA, ventilation and perfusion images are acquired post injection in multiple projections.


 

FINDINGS: 

7/1/2017 No moderate or large mismatched defects are present. No triple matched defects are evident.

 

IMPRESSION: 

Low probability for pulmonary embolism

## 2017-07-01 NOTE — ED
General Adult HPI





- General


Chief complaint: Upper Respiratory Infection


Stated complaint: Cough


Time Seen by Provider: 17 12:45


Source: patient, family, RN notes reviewed


Mode of arrival: wheelchair


Limitations: no limitations





- History of Present Illness


Initial comments: 





76-year-old female presents to the emergency department with a chief complaint 

of cough.  Patient has had a cough for the past week or so.  Patient states she'

s been on antibiotics to put her on steroids and does not seem to getting 

better.  Patient states that she is having a hard time in sleeping and this 

morning the  state they were having a little bit of confusion with the 

patient.  They state that they were concerned due to the continued cough and 

not getting better so they thought that they should be evaluated.Patient denies 

any recent fever, chills, chest pain, back pain, abdominal pain, nausea vomiting

, numbness or tingling, dysuria or hematuria, constipation or diarrhea, 

headaches or visual changes, or any other current symptoms.





- Related Data


 Home Medications











 Medication  Instructions  Recorded  Confirmed


 


Sertraline HCl [Zoloft] 200 mg PO DAILY 02/17/15 07/01/17


 


Levothyroxine Sodium [Synthroid] 75 mcg PO DAILY 04/28/15 07/01/17


 


Montelukast [Singulair] 10 mg PO HS 04/28/15 07/01/17


 


Metoprolol Tartrate [Lopressor] 12.5 mg PO BID 10/13/15 07/01/17


 


Warfarin [Coumadin] 2.5 mg PO DAILY 10/13/15 07/01/17


 


Atorvastatin [Lipitor] 40 mg PO HS 16


 


Gabapentin [Neurontin] 1,200 mg PO BID 16


 


Nitroglycerin Sl Tabs [Nitrostat] 0.4 mg SUBLINGUAL Q5M PRN 16


 


ALPRAZolam [Xanax] 1 mg PO Q8HR PRN 17


 


Albuterol Nebulized [Ventolin 2.5 mg INHALATION RT-QID PRN 17





Nebulized]   


 


Aspirin EC [Ecotrin Low Dose] 81 mg PO DAILY 17


 


Cyclobenzaprine HCl 10 mg PO HS 17


 


Loratadine-Pseudoeph  mg 1 tab PO DAILY PRN 17





[Claritin-D 24 Hr]   


 


guaiFENesin [Mucinex] 600 mg PO DAILY PRN 17








 Previous Rx's











 Medication  Instructions  Recorded


 


Azithromycin [Zithromax] 250 mg PO AS DIRECTED #6 tab 17











 Allergies











Allergy/AdvReac Type Severity Reaction Status Date / Time


 


iodine Allergy Severe throat Verified 17 13:47





   Swelling  


 


latex Allergy Severe throat Verified 17 13:47





   swelling  


 


codeine Allergy  Nausea & Verified 17 13:47





   Vomiting  


 


Penicillins Allergy  Rash/Hives Verified 17 13:47


 


doxycycline AdvReac  Nausea & Verified 17 13:47





   Vomiting  














Review of Systems


ROS Statement: 


Those systems with pertinent positive or pertinent negative responses have been 

documented in the HPI.





ROS Other: All systems not noted in ROS Statement are negative.





Past Medical History


Past Medical History: Atrial Fibrillation, Asthma, Coronary Artery Disease (CAD)

, Chest Pain / Angina, COPD, CVA/TIA, Hyperlipidemia, Hypertension, Myocardial 

Infarction (MI), Osteoarthritis (OA), Thyroid Disorder, Vascular Disorder


Additional Past Medical History / Comment(s): LUPUS , RIGHT SIDED WEAKNESS and 

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 

she gets "black out" spells and often falls


Last Myocardial Infarction Date:: 2012


History of Any Multi-Drug Resistant Organisms: None Reported


Past Surgical History: Appendectomy, Heart Catheterization With Stent, 

Orthopedic Surgery, Tonsillectomy


Additional Past Surgical History / Comment(s): 3 cardiac stents, PTCA   tremaine 

carotid endarterectomy, laparoscopy-adhesions removed. tremaine arthroscopic knee 

surgery, bilateral cataract removal,


Past Anesthesia/Blood Transfusion Reactions: No Reported Reaction


Additional Past Anesthesia/Blood Transfusion Reaction / Comment(s): .


Date of Last Stent Placement:: (not sure month)


Past Psychological History: Anxiety


Smoking Status: Current some day smoker


Past Alcohol Use History: None Reported


Past Drug Use History: None Reported





- Past Family History


  ** Father


Family Medical History: Cancer


Additional Family Medical History / Comment(s): Father had massive MI at age 66 

yrs.  He  from CVA at age 79yrs.





  ** Mother


Family Medical History: CVA/TIA, Deep Vein Thrombosis (DVT), Pulmonary Embolus


Additional Family Medical History / Comment(s): Mother  of CVA at age 79 

yrs.





General Exam


Limitations: no limitations


General appearance: alert, in no apparent distress


Head exam: Present: atraumatic, normocephalic, normal inspection


Eye exam: Present: normal appearance, PERRL, EOMI.  Absent: scleral icterus, 

conjunctival injection, periorbital swelling


ENT exam: Present: normal exam, mucous membranes moist


Neck exam: Present: normal inspection.  Absent: tenderness, meningismus, 

lymphadenopathy


Respiratory exam: Present: normal lung sounds bilaterally.  Absent: respiratory 

distress, wheezes, rales, rhonchi, stridor


Cardiovascular Exam: Present: regular rate, normal rhythm, normal heart sounds.

  Absent: systolic murmur, diastolic murmur, rubs, gallop, clicks


GI/Abdominal exam: Present: soft, normal bowel sounds.  Absent: distended, 

tenderness, guarding, rebound, rigid


Neurological exam: Present: alert, oriented X3


Psychiatric exam: Present: normal affect, normal mood


Skin exam: Present: warm, dry, intact, normal color.  Absent: rash





Course


 Vital Signs











  17





  12:04 13:00 14:54


 


Temperature 97.1 F L  


 


Pulse Rate 62 70 54 L


 


Respiratory 18 18 20





Rate   


 


Blood Pressure 93/51 161/84 126/80


 


O2 Sat by Pulse 97 94 L 100





Oximetry   














  17





  17:28 18:30


 


Temperature  98.1 F


 


Pulse Rate 62 54 L


 


Respiratory 18 20





Rate  


 


Blood Pressure 158/74 147/74


 


O2 Sat by Pulse 97 96





Oximetry  














EKG Findings





- EKG Comments:


EKG Findings:: Sinus bradycardia 54 bpm, normal axis, no atopy, no S-T 

depressions or elevations,





Medical Decision Making





- Medical Decision Making





76-year-old female presents to the emergency room chief complaint of cough and 

shortness of breath.  This time imaging results are reviewed.  Patient does 

appear to have dehydration she was given a liter of fluids here due to CHF we 

did hydrate more.  Patient did appear to have bumped creatinine.  We did 

discuss this with the family.  We discussed increasing fluids.  Patient has had 

a cough and did present with a headache.  This time CT is negative as well as 

chest x-ray.  She has been on multiple rounds of antibiotics for home.  We will 

put the patient on azithromycin for home.  We did discuss close follow-up with 

her doctor and recheck of the kidney function.  Family stated they understood 

and they are in agreement pain.  Patient is sleeping the room and states that 

she is feeling better.  He will be discharged





- Lab Data


Result diagrams: 


 17 13:30





 17 13:30


 Lab Results











  17 Range/Units





  13:30 13:30 13:30 


 


WBC   12.5 H   (3.8-10.6)  k/uL


 


RBC   4.39   (3.80-5.40)  m/uL


 


Hgb   13.4   (11.4-16.0)  gm/dL


 


Hct   40.1   (34.0-46.0)  %


 


MCV   91.3  D   (80.0-100.0)  fL


 


MCH   30.6   (25.0-35.0)  pg


 


MCHC   33.5   (31.0-37.0)  g/dL


 


RDW   14.9   (11.5-15.5)  %


 


Plt Count   255   (150-450)  k/uL


 


Neutrophils %   68   %


 


Lymphocytes %   23   %


 


Monocytes %   7   %


 


Eosinophils %   1   %


 


Basophils %   0   %


 


Neutrophils #   8.5 H   (1.3-7.7)  k/uL


 


Lymphocytes #   2.9   (1.0-4.8)  k/uL


 


Monocytes #   0.9   (0-1.0)  k/uL


 


Eosinophils #   0.1   (0-0.7)  k/uL


 


Basophils #   0.0   (0-0.2)  k/uL


 


PT     (9.0-12.0)  sec


 


INR     (<1.1)  


 


APTT     (22.0-30.0)  sec


 


D-Dimer     (<0.60)  mg/L FEU


 


Sodium    135 L  (137-145)  mmol/L


 


Potassium    3.7  (3.5-5.1)  mmol/L


 


Chloride    101  ()  mmol/L


 


Carbon Dioxide    22  (22-30)  mmol/L


 


Anion Gap    12  mmol/L


 


BUN    26 H  (7-17)  mg/dL


 


Creatinine    1.40 H  (0.52-1.04)  mg/dL


 


Est GFR (MDRD) Af Amer    44  (>60 ml/min/1.73 sqM)  


 


Est GFR (MDRD) Non-Af    37  (>60 ml/min/1.73 sqM)  


 


Glucose    85  (74-99)  mg/dL


 


Plasma Lactic Acid Dudley     (0.7-2.0)  mmol/L


 


Calcium    9.1  (8.4-10.2)  mg/dL


 


Magnesium    2.1  (1.6-2.3)  mg/dL


 


Total Bilirubin    0.4  (0.2-1.3)  mg/dL


 


AST    22  (14-36)  U/L


 


ALT    29  (9-52)  U/L


 


Alkaline Phosphatase    87  ()  U/L


 


Total Creatine Kinase  30    ()  U/L


 


CK-MB (CK-2)  1.3    (0.0-2.4)  ng/mL


 


CK-MB (CK-2) Rel Index  4.3    


 


Troponin I  <0.012    (0.000-0.034)  ng/mL


 


NT-Pro-B Natriuret Pep     pg/mL


 


Total Protein    6.2 L  (6.3-8.2)  g/dL


 


Albumin    3.7  (3.5-5.0)  g/dL


 


Urine Color     


 


Urine Appearance     (Clear)  


 


Urine pH     (5.0-8.0)  


 


Ur Specific Gravity     (1.001-1.035)  


 


Urine Protein     (Negative)  


 


Urine Glucose (UA)     (Negative)  


 


Urine Ketones     (Negative)  


 


Urine Blood     (Negative)  


 


Urine Nitrite     (Negative)  


 


Urine Bilirubin     (Negative)  


 


Urine Urobilinogen     (<2.0)  mg/dL


 


Ur Leukocyte Esterase     (Negative)  


 


Urine RBC     (0-5)  /hpf


 


Urine WBC     (0-5)  /hpf


 


Ur Squamous Epith Cells     (0-4)  /hpf


 


Urine Bacteria     (None)  /hpf


 


Hyaline Casts     (0-2)  /lpf














  17 Range/Units





  13:30 13:30 13:30 


 


WBC     (3.8-10.6)  k/uL


 


RBC     (3.80-5.40)  m/uL


 


Hgb     (11.4-16.0)  gm/dL


 


Hct     (34.0-46.0)  %


 


MCV     (80.0-100.0)  fL


 


MCH     (25.0-35.0)  pg


 


MCHC     (31.0-37.0)  g/dL


 


RDW     (11.5-15.5)  %


 


Plt Count     (150-450)  k/uL


 


Neutrophils %     %


 


Lymphocytes %     %


 


Monocytes %     %


 


Eosinophils %     %


 


Basophils %     %


 


Neutrophils #     (1.3-7.7)  k/uL


 


Lymphocytes #     (1.0-4.8)  k/uL


 


Monocytes #     (0-1.0)  k/uL


 


Eosinophils #     (0-0.7)  k/uL


 


Basophils #     (0-0.2)  k/uL


 


PT  11.1    (9.0-12.0)  sec


 


INR  1.1    (<1.1)  


 


APTT  23.9    (22.0-30.0)  sec


 


D-Dimer  1.29 H    (<0.60)  mg/L FEU


 


Sodium     (137-145)  mmol/L


 


Potassium     (3.5-5.1)  mmol/L


 


Chloride     ()  mmol/L


 


Carbon Dioxide     (22-30)  mmol/L


 


Anion Gap     mmol/L


 


BUN     (7-17)  mg/dL


 


Creatinine     (0.52-1.04)  mg/dL


 


Est GFR (MDRD) Af Amer     (>60 ml/min/1.73 sqM)  


 


Est GFR (MDRD) Non-Af     (>60 ml/min/1.73 sqM)  


 


Glucose     (74-99)  mg/dL


 


Plasma Lactic Acid Dudley   1.2   (0.7-2.0)  mmol/L


 


Calcium     (8.4-10.2)  mg/dL


 


Magnesium     (1.6-2.3)  mg/dL


 


Total Bilirubin     (0.2-1.3)  mg/dL


 


AST     (14-36)  U/L


 


ALT     (9-52)  U/L


 


Alkaline Phosphatase     ()  U/L


 


Total Creatine Kinase     ()  U/L


 


CK-MB (CK-2)     (0.0-2.4)  ng/mL


 


CK-MB (CK-2) Rel Index     


 


Troponin I     (0.000-0.034)  ng/mL


 


NT-Pro-B Natriuret Pep     pg/mL


 


Total Protein     (6.3-8.2)  g/dL


 


Albumin     (3.5-5.0)  g/dL


 


Urine Color    Yellow  


 


Urine Appearance    Clear  (Clear)  


 


Urine pH    6.5  (5.0-8.0)  


 


Ur Specific Gravity    1.010  (1.001-1.035)  


 


Urine Protein    Negative  (Negative)  


 


Urine Glucose (UA)    Negative  (Negative)  


 


Urine Ketones    Negative  (Negative)  


 


Urine Blood    Negative  (Negative)  


 


Urine Nitrite    Negative  (Negative)  


 


Urine Bilirubin    Negative  (Negative)  


 


Urine Urobilinogen    <2.0  (<2.0)  mg/dL


 


Ur Leukocyte Esterase    Small H  (Negative)  


 


Urine RBC    1  (0-5)  /hpf


 


Urine WBC    2  (0-5)  /hpf


 


Ur Squamous Epith Cells    3  (0-4)  /hpf


 


Urine Bacteria    Rare H  (None)  /hpf


 


Hyaline Casts    9 H  (0-2)  /lpf














  17 Range/Units





  13:30 


 


WBC   (3.8-10.6)  k/uL


 


RBC   (3.80-5.40)  m/uL


 


Hgb   (11.4-16.0)  gm/dL


 


Hct   (34.0-46.0)  %


 


MCV   (80.0-100.0)  fL


 


MCH   (25.0-35.0)  pg


 


MCHC   (31.0-37.0)  g/dL


 


RDW   (11.5-15.5)  %


 


Plt Count   (150-450)  k/uL


 


Neutrophils %   %


 


Lymphocytes %   %


 


Monocytes %   %


 


Eosinophils %   %


 


Basophils %   %


 


Neutrophils #   (1.3-7.7)  k/uL


 


Lymphocytes #   (1.0-4.8)  k/uL


 


Monocytes #   (0-1.0)  k/uL


 


Eosinophils #   (0-0.7)  k/uL


 


Basophils #   (0-0.2)  k/uL


 


PT   (9.0-12.0)  sec


 


INR   (<1.1)  


 


APTT   (22.0-30.0)  sec


 


D-Dimer   (<0.60)  mg/L FEU


 


Sodium   (137-145)  mmol/L


 


Potassium   (3.5-5.1)  mmol/L


 


Chloride   ()  mmol/L


 


Carbon Dioxide   (22-30)  mmol/L


 


Anion Gap   mmol/L


 


BUN   (7-17)  mg/dL


 


Creatinine   (0.52-1.04)  mg/dL


 


Est GFR (MDRD) Af Amer   (>60 ml/min/1.73 sqM)  


 


Est GFR (MDRD) Non-Af   (>60 ml/min/1.73 sqM)  


 


Glucose   (74-99)  mg/dL


 


Plasma Lactic Acid Dudley   (0.7-2.0)  mmol/L


 


Calcium   (8.4-10.2)  mg/dL


 


Magnesium   (1.6-2.3)  mg/dL


 


Total Bilirubin   (0.2-1.3)  mg/dL


 


AST   (14-36)  U/L


 


ALT   (9-52)  U/L


 


Alkaline Phosphatase   ()  U/L


 


Total Creatine Kinase   ()  U/L


 


CK-MB (CK-2)   (0.0-2.4)  ng/mL


 


CK-MB (CK-2) Rel Index   


 


Troponin I   (0.000-0.034)  ng/mL


 


NT-Pro-B Natriuret Pep  1960  pg/mL


 


Total Protein   (6.3-8.2)  g/dL


 


Albumin   (3.5-5.0)  g/dL


 


Urine Color   


 


Urine Appearance   (Clear)  


 


Urine pH   (5.0-8.0)  


 


Ur Specific Gravity   (1.001-1.035)  


 


Urine Protein   (Negative)  


 


Urine Glucose (UA)   (Negative)  


 


Urine Ketones   (Negative)  


 


Urine Blood   (Negative)  


 


Urine Nitrite   (Negative)  


 


Urine Bilirubin   (Negative)  


 


Urine Urobilinogen   (<2.0)  mg/dL


 


Ur Leukocyte Esterase   (Negative)  


 


Urine RBC   (0-5)  /hpf


 


Urine WBC   (0-5)  /hpf


 


Ur Squamous Epith Cells   (0-4)  /hpf


 


Urine Bacteria   (None)  /hpf


 


Hyaline Casts   (0-2)  /lpf














- Radiology Data


Radiology results: report reviewed, image reviewed





Disposition


Clinical Impression: 


 Dehydration, Headache, Upper respiratory infection





Disposition: HOME SELF-CARE


Condition: Stable


Instructions:  Upper Respiratory Infection (ED)


Additional Instructions: 


Please use medication as discussed. Please follow up with family doctor if 

symptoms have not improved over the next two days. Please return to the 

emergency room if your symptoms increase or worsen or for any other concerns. 


Prescriptions: 


Azithromycin [Zithromax] 250 mg PO AS DIRECTED #6 tab


Referrals: 


Russell Cortes MD [Primary Care Provider] - 1-2 days


Time of Disposition: 19:00

## 2017-07-01 NOTE — CT
EXAMINATION TYPE: CT brain wo con

 

DATE OF EXAM: 7/1/2017

 

COMPARISON: 2/22/2016

 

INDICATION: Pain

 

DLP: mGycm, Automated exposure control for dose reduction was used.

 

CONTRAST: None

 

CT of the brain is performed utilizing 3 mm thick sections through the posterior fossa and 3 mm thick
 sections through the remaining calvarium.  Study is performed within 24 hours of arrival to the hosp
ital. 

 

No abnormal hyperdensity is present to suggest an acute intracranial hemorrhage.

No mass lesion is evident.

No acute infarcts are evident. Deep white matter chronic changes are present. There is an old infarct
 through the left parietal-occipital watershed region. Findings are stable from comparison.

Ventricles and sulci are appropriate for the patient age.  

Paranasal sinuses and mastoid air cells within the field-of-view are clear.

 

IMPRESSIONS:

1.   Chronic white matter changes and old watershed infarct on the left.

2. No acute intracranial process.

## 2017-07-01 NOTE — XR
EXAMINATION TYPE: XR chest 2V

 

DATE OF EXAM: 7/1/2017

 

COMPARISON: 6/17/2017

 

INDICATION: Chest pain

 

TECHNIQUE:  Frontal and lateral views of the chest are obtained.

 

FINDINGS:  

The heart size is normal.  

The pulmonary vasculature is normal.

The lungs are clear.  There is a wedge deformity of a lower thoracic vertebral level which may have h
ad some vertebral plasty performed.

 

IMPRESSION:  

1. No acute pulmonary process.

## 2017-11-26 ENCOUNTER — HOSPITAL ENCOUNTER (EMERGENCY)
Dept: HOSPITAL 47 - EC | Age: 77
Discharge: HOME | End: 2017-11-26
Payer: MEDICARE

## 2017-11-26 VITALS — TEMPERATURE: 98.7 F | HEART RATE: 88 BPM | DIASTOLIC BLOOD PRESSURE: 88 MMHG | SYSTOLIC BLOOD PRESSURE: 178 MMHG

## 2017-11-26 VITALS — RESPIRATION RATE: 18 BRPM

## 2017-11-26 DIAGNOSIS — Z86.73: ICD-10-CM

## 2017-11-26 DIAGNOSIS — E78.5: ICD-10-CM

## 2017-11-26 DIAGNOSIS — M19.90: ICD-10-CM

## 2017-11-26 DIAGNOSIS — F41.9: ICD-10-CM

## 2017-11-26 DIAGNOSIS — F17.200: ICD-10-CM

## 2017-11-26 DIAGNOSIS — I67.82: ICD-10-CM

## 2017-11-26 DIAGNOSIS — Z79.899: ICD-10-CM

## 2017-11-26 DIAGNOSIS — I10: ICD-10-CM

## 2017-11-26 DIAGNOSIS — Z86.79: ICD-10-CM

## 2017-11-26 DIAGNOSIS — J98.4: ICD-10-CM

## 2017-11-26 DIAGNOSIS — Z79.01: ICD-10-CM

## 2017-11-26 DIAGNOSIS — J44.9: ICD-10-CM

## 2017-11-26 DIAGNOSIS — R51: ICD-10-CM

## 2017-11-26 DIAGNOSIS — J18.9: Primary | ICD-10-CM

## 2017-11-26 DIAGNOSIS — R23.3: ICD-10-CM

## 2017-11-26 DIAGNOSIS — Z79.82: ICD-10-CM

## 2017-11-26 DIAGNOSIS — I25.10: ICD-10-CM

## 2017-11-26 DIAGNOSIS — Z88.5: ICD-10-CM

## 2017-11-26 DIAGNOSIS — I25.2: ICD-10-CM

## 2017-11-26 DIAGNOSIS — Z88.1: ICD-10-CM

## 2017-11-26 DIAGNOSIS — Z88.0: ICD-10-CM

## 2017-11-26 DIAGNOSIS — Z91.048: ICD-10-CM

## 2017-11-26 DIAGNOSIS — E07.9: ICD-10-CM

## 2017-11-26 DIAGNOSIS — Z83.6: ICD-10-CM

## 2017-11-26 DIAGNOSIS — Z91.040: ICD-10-CM

## 2017-11-26 DIAGNOSIS — R19.5: ICD-10-CM

## 2017-11-26 LAB
ALP SERPL-CCNC: 90 U/L (ref 38–126)
ALT SERPL-CCNC: 40 U/L (ref 9–52)
ANION GAP SERPL CALC-SCNC: 10 MMOL/L
APTT BLD: 27.9 SEC (ref 22–30)
AST SERPL-CCNC: 50 U/L (ref 14–36)
BASOPHILS # BLD AUTO: 0.1 K/UL (ref 0–0.2)
BASOPHILS NFR BLD AUTO: 1 %
BUN SERPL-SCNC: 15 MG/DL (ref 7–17)
CALCIUM SPEC-MCNC: 9.4 MG/DL (ref 8.4–10.2)
CH: 30.2
CHCM: 30.9
CHLORIDE SERPL-SCNC: 108 MMOL/L (ref 98–107)
CK SERPL-CCNC: 131 U/L (ref 30–135)
CO2 SERPL-SCNC: 21 MMOL/L (ref 22–30)
EOSINOPHIL # BLD AUTO: 1 K/UL (ref 0–0.7)
EOSINOPHIL NFR BLD AUTO: 7 %
ERYTHROCYTE [DISTWIDTH] IN BLOOD BY AUTOMATED COUNT: 3.66 M/UL (ref 3.8–5.4)
ERYTHROCYTE [DISTWIDTH] IN BLOOD: 18.6 % (ref 11.5–15.5)
GLUCOSE SERPL-MCNC: 109 MG/DL (ref 74–99)
HCT VFR BLD AUTO: 36.2 % (ref 34–46)
HDW: 2.94
HGB BLD-MCNC: 10.8 GM/DL (ref 11.4–16)
INR PPP: 1.2 (ref ?–1.2)
LUC NFR BLD AUTO: 1 %
LYMPHOCYTES # SPEC AUTO: 3.2 K/UL (ref 1–4.8)
LYMPHOCYTES NFR SPEC AUTO: 22 %
MCH RBC QN AUTO: 29.6 PG (ref 25–35)
MCHC RBC AUTO-ENTMCNC: 29.9 G/DL (ref 31–37)
MCV RBC AUTO: 98.8 FL (ref 80–100)
MONOCYTES # BLD AUTO: 1.2 K/UL (ref 0–1)
MONOCYTES NFR BLD AUTO: 8 %
NEUTROPHILS # BLD AUTO: 8.7 K/UL (ref 1.3–7.7)
NEUTROPHILS NFR BLD AUTO: 61 %
NON-AFRICAN AMERICAN GFR(MDRD): 54
POTASSIUM SERPL-SCNC: 3.5 MMOL/L (ref 3.5–5.1)
PROT SERPL-MCNC: 7.9 G/DL (ref 6.3–8.2)
PT BLD: 12.1 SEC (ref 9–12)
SODIUM SERPL-SCNC: 139 MMOL/L (ref 137–145)
TROPONIN I SERPL-MCNC: <0.012 NG/ML (ref 0–0.03)
WBC # BLD AUTO: 0.13 10*3/UL
WBC # BLD AUTO: 14.3 K/UL (ref 3.8–10.6)
WBC (PEROX): 14.6

## 2017-11-26 PROCEDURE — 84484 ASSAY OF TROPONIN QUANT: CPT

## 2017-11-26 PROCEDURE — 93005 ELECTROCARDIOGRAM TRACING: CPT

## 2017-11-26 PROCEDURE — 94640 AIRWAY INHALATION TREATMENT: CPT

## 2017-11-26 PROCEDURE — 82272 OCCULT BLD FECES 1-3 TESTS: CPT

## 2017-11-26 PROCEDURE — 96374 THER/PROPH/DIAG INJ IV PUSH: CPT

## 2017-11-26 PROCEDURE — 70450 CT HEAD/BRAIN W/O DYE: CPT

## 2017-11-26 PROCEDURE — 85025 COMPLETE CBC W/AUTO DIFF WBC: CPT

## 2017-11-26 PROCEDURE — 85730 THROMBOPLASTIN TIME PARTIAL: CPT

## 2017-11-26 PROCEDURE — 85610 PROTHROMBIN TIME: CPT

## 2017-11-26 PROCEDURE — 82550 ASSAY OF CK (CPK): CPT

## 2017-11-26 PROCEDURE — 71020: CPT

## 2017-11-26 PROCEDURE — 99284 EMERGENCY DEPT VISIT MOD MDM: CPT

## 2017-11-26 PROCEDURE — 80053 COMPREHEN METABOLIC PANEL: CPT

## 2017-11-26 PROCEDURE — 82553 CREATINE MB FRACTION: CPT

## 2017-11-26 PROCEDURE — 36415 COLL VENOUS BLD VENIPUNCTURE: CPT

## 2017-11-26 NOTE — CT
EXAMINATION TYPE: CT brain wo con

 

DATE OF EXAM: 11/26/2017

 

COMPARISON: 7/1/2017

 

HISTORY: Headache and leg bruising.

 

CT DLP: 967.4 mGycm

Automated exposure control for dose reduction was used.

 

FINDINGS: 

There is cerebral cortical atrophy. There is old left parietal 4 x 2 cm cortical infarct. There is no
 mass effect nor midline shift. There is no sign of intracranial hemorrhage. The calvarium is intact.
 There is some hypodensity in the white matter around both lateral ventricles at the frontal horns.

 

IMPRESSION: 

CHRONIC SMALL VESSEL ISCHEMIA. 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 NO ACUTE INTRACRANIAL ABNORMALITY.
 NO CHANGE.

## 2017-11-26 NOTE — ED
General Adult HPI





- General


Chief complaint: Recheck/Abnormal Lab/Rx


Stated complaint: bruising


Time Seen by Provider: 17 19:12


Source: patient, family, RN notes reviewed


Mode of arrival: ambulatory


Limitations: no limitations





- History of Present Illness


Initial comments: 





Patient is a pleasant 77-year-old female presenting to the emergency department 

with concerns regarding bruising patient had her Coumadin dose doubled around 3 

weeks ago.  Patient has had bruising of her legs and left thigh over the past 

week.  Mild discomfort.  Patient has had bleeding from her nose and mouth.  

Patient may have coughed up some blood.  Patient is unclear if she may have 

vomited.  Patient has had some dark stools.  Patient did have a headache.  No 

confusion or weakness.  Patient is on Coumadin secondary to history of strokes.





- Related Data


 Home Medications











 Medication  Instructions  Recorded  Confirmed


 


Sertraline HCl [Zoloft] 200 mg PO DAILY 02/17/15 11/26/17


 


Levothyroxine Sodium [Synthroid] 75 mcg PO DAILY 04/28/15 11/26/17


 


Montelukast [Singulair] 10 mg PO HS 04/28/15 11/26/17


 


Metoprolol Tartrate [Lopressor] 12.5 mg PO BID 10/13/15 11/26/17


 


Atorvastatin [Lipitor] 40 mg PO HS 16


 


Gabapentin [Neurontin] 1,200 mg PO BID 16


 


Aspirin EC [Ecotrin Low Dose] 81 mg PO DAILY 17


 


Cyclobenzaprine HCl 10 mg PO HS 17


 


ALPRAZolam [Xanax] 0.5 mg PO QID PRN 17


 


Tiotropium 18 Mcg/Puff [Spiriva] 1 cap INHALATION RT-DAILY 17


 


Warfarin [Coumadin] 5 mg PO SUMOTUTHFRSA 17


 


Warfarin [Coumadin] 7.5 mg PO WE 17


 


traMADol HCL [Ultram] 50 mg PO TID PRN 17








 Previous Rx's











 Medication  Instructions  Recorded


 


Moxifloxacin HCl [Avelox] 400 mg PO DAILY #7 tablet 17











 Allergies











Allergy/AdvReac Type Severity Reaction Status Date / Time


 


iodine Allergy Severe throat Verified 17 19:04





   Swelling  


 


latex Allergy Severe throat Verified 17 19:04





   swelling  


 


codeine Allergy  Nausea & Verified 17 19:04





   Vomiting  


 


Penicillins Allergy  Rash/Hives Verified 17 19:04


 


doxycycline AdvReac  Nausea & Verified 17 19:04





   Vomiting  














Review of Systems


ROS Statement: 


Those systems with pertinent positive or pertinent negative responses have been 

documented in the HPI.





ROS Other: All systems not noted in ROS Statement are negative.


Constitutional: Denies: fever


Eyes: Denies: eye pain


ENT: Denies: ear pain


Respiratory: Reports: cough.  Denies: dyspnea


Cardiovascular: Denies: chest pain


Endocrine: Denies: fatigue


Gastrointestinal: Denies: abdominal pain


Genitourinary: Denies: dysuria


Musculoskeletal: Denies: back pain


Skin: Reports: other (Bruising)


Neurological: Reports: headache.  Denies: weakness, confusion





Past Medical History


Past Medical History: Atrial Fibrillation, Asthma, Coronary Artery Disease (CAD)

, Chest Pain / Angina, COPD, CVA/TIA, Hyperlipidemia, Hypertension, Myocardial 

Infarction (MI), Osteoarthritis (OA), Thyroid Disorder, Vascular Disorder


Additional Past Medical History / Comment(s): LUPUS , RIGHT SIDED WEAKNESS and 

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 

she gets "black out" spells and often falls


Last Myocardial Infarction Date:: 2012


History of Any Multi-Drug Resistant Organisms: None Reported


Past Surgical History: Appendectomy, Heart Catheterization With Stent, 

Orthopedic Surgery, Tonsillectomy


Additional Past Surgical History / Comment(s): 3 cardiac stents, PTCA   tremaine 

carotid endarterectomy, laparoscopy-adhesions removed. tremaine arthroscopic knee 

surgery, bilateral cataract removal,


Past Anesthesia/Blood Transfusion Reactions: No Reported Reaction


Additional Past Anesthesia/Blood Transfusion Reaction / Comment(s): .


Date of Last Stent Placement:: (not sure month)


Past Psychological History: Anxiety


Smoking Status: Current some day smoker


Past Alcohol Use History: None Reported


Past Drug Use History: None Reported





- Past Family History


  ** Father


Family Medical History: Cancer


Additional Family Medical History / Comment(s): Father had massive MI at age 66 

yrs.  He  from CVA at age 79yrs.





  ** Mother


Family Medical History: CVA/TIA, Deep Vein Thrombosis (DVT), Pulmonary Embolus


Additional Family Medical History / Comment(s): Mother  of CVA at age 79 

yrs.





General Exam


Limitations: no limitations


General appearance: alert, in no apparent distress


Head exam: Present: atraumatic


Eye exam: Present: normal appearance, PERRL


ENT exam: Present: normal exam, normal oropharynx


Neck exam: Present: normal inspection


Respiratory exam: Present: wheezes


Cardiovascular Exam: Present: regular rate, normal rhythm


GI/Abdominal exam: Present: soft.  Absent: tenderness


Rectal exam: Present: normal inspection.  Absent: black stool, bloody stool


Extremities exam: Present: normal inspection


Neurological exam: Present: alert, oriented X3, CN II-XII intact.  Absent: 

motor sensory deficit


  ** Expanded


Motor strength exam: RUE: 5, LUE: 5, RLE: 5, LLE: 5


Psychiatric exam: Present: normal affect, normal mood


Skin exam: Present: other (Left thigh ecchymosis.  Mild ecchymosis bilateral 

lower legs and feet)





Course


 Vital Signs











  17





  19:00 20:28


 


Temperature 97.7 F 


 


Pulse Rate 70 62


 


Respiratory 18 





Rate  


 


Blood Pressure 167/76 


 


O2 Sat by Pulse 100 





Oximetry  














EKG Findings





- EKG Comments:


EKG Findings:: Normal sinus rhythm 68.  .  QRS 82.  .  .  

Normal axis.  Normal QRS.  No acute ST change.





Medical Decision Making





- Medical Decision Making





Patient reevaluated and resting comfortably in bed.  Patient and family updated 

on results and plan and need for close follow-up.  Case was discussed in detail 

with Dr. Rodriguez who does agree with discharge.  He recommends patient take 

Coumadin 2.5 mg daily for now which she does have.  Also recommends Avelox 400 

daily for the next week.





- Lab Data


Result diagrams: 


 17 19:36





 17 19:36


 Lab Results











  17 Range/Units





  19:36 19:36 19:36 


 


WBC   14.3 H   (3.8-10.6)  k/uL


 


RBC   3.66 L   (3.80-5.40)  m/uL


 


Hgb   10.8 L   (11.4-16.0)  gm/dL


 


Hct   36.2   (34.0-46.0)  %


 


MCV   98.8   (80.0-100.0)  fL


 


MCH   29.6   (25.0-35.0)  pg


 


MCHC   29.9 L   (31.0-37.0)  g/dL


 


RDW   18.6 H   (11.5-15.5)  %


 


Plt Count   465 H   (150-450)  k/uL


 


Neutrophils %   61   %


 


Lymphocytes %   22   %


 


Monocytes %   8   %


 


Eosinophils %   7   %


 


Basophils %   1   %


 


Neutrophils #   8.7 H   (1.3-7.7)  k/uL


 


Lymphocytes #   3.2   (1.0-4.8)  k/uL


 


Monocytes #   1.2 H   (0-1.0)  k/uL


 


Eosinophils #   1.0 H   (0-0.7)  k/uL


 


Basophils #   0.1   (0-0.2)  k/uL


 


Hypochromasia   Moderate   


 


Anisocytosis   Slight   


 


Macrocytosis   Slight   


 


PT     (9.0-12.0)  sec


 


INR     (<1.2)  


 


APTT     (22.0-30.0)  sec


 


Sodium    139  (137-145)  mmol/L


 


Potassium    3.5  (3.5-5.1)  mmol/L


 


Chloride    108 H  ()  mmol/L


 


Carbon Dioxide    21 L  (22-30)  mmol/L


 


Anion Gap    10  mmol/L


 


BUN    15  (7-17)  mg/dL


 


Creatinine    1.00  (0.52-1.04)  mg/dL


 


Est GFR (MDRD) Af Amer    >60  (>60 ml/min/1.73 sqM)  


 


Est GFR (MDRD) Non-Af    54  (>60 ml/min/1.73 sqM)  


 


Glucose    109 H  (74-99)  mg/dL


 


Calcium    9.4  (8.4-10.2)  mg/dL


 


Total Bilirubin    1.3  (0.2-1.3)  mg/dL


 


AST    50 H  (14-36)  U/L


 


ALT    40  (9-52)  U/L


 


Alkaline Phosphatase    90  ()  U/L


 


Total Creatine Kinase  131    ()  U/L


 


CK-MB (CK-2)  4.7 H*    (0.0-2.4)  ng/mL


 


CK-MB (CK-2) Rel Index  3.6    


 


Troponin I  <0.012    (0.000-0.034)  ng/mL


 


Total Protein    7.9  (6.3-8.2)  g/dL


 


Albumin    4.3  (3.5-5.0)  g/dL


 


Stool Occult Blood     (Negative)  














  17 Range/Units





  19:36 20:56 


 


WBC    (3.8-10.6)  k/uL


 


RBC    (3.80-5.40)  m/uL


 


Hgb    (11.4-16.0)  gm/dL


 


Hct    (34.0-46.0)  %


 


MCV    (80.0-100.0)  fL


 


MCH    (25.0-35.0)  pg


 


MCHC    (31.0-37.0)  g/dL


 


RDW    (11.5-15.5)  %


 


Plt Count    (150-450)  k/uL


 


Neutrophils %    %


 


Lymphocytes %    %


 


Monocytes %    %


 


Eosinophils %    %


 


Basophils %    %


 


Neutrophils #    (1.3-7.7)  k/uL


 


Lymphocytes #    (1.0-4.8)  k/uL


 


Monocytes #    (0-1.0)  k/uL


 


Eosinophils #    (0-0.7)  k/uL


 


Basophils #    (0-0.2)  k/uL


 


Hypochromasia    


 


Anisocytosis    


 


Macrocytosis    


 


PT  12.1 H   (9.0-12.0)  sec


 


INR  1.2 H   (<1.2)  


 


APTT  27.9   (22.0-30.0)  sec


 


Sodium    (137-145)  mmol/L


 


Potassium    (3.5-5.1)  mmol/L


 


Chloride    ()  mmol/L


 


Carbon Dioxide    (22-30)  mmol/L


 


Anion Gap    mmol/L


 


BUN    (7-17)  mg/dL


 


Creatinine    (0.52-1.04)  mg/dL


 


Est GFR (MDRD) Af Amer    (>60 ml/min/1.73 sqM)  


 


Est GFR (MDRD) Non-Af    (>60 ml/min/1.73 sqM)  


 


Glucose    (74-99)  mg/dL


 


Calcium    (8.4-10.2)  mg/dL


 


Total Bilirubin    (0.2-1.3)  mg/dL


 


AST    (14-36)  U/L


 


ALT    (9-52)  U/L


 


Alkaline Phosphatase    ()  U/L


 


Total Creatine Kinase    ()  U/L


 


CK-MB (CK-2)    (0.0-2.4)  ng/mL


 


CK-MB (CK-2) Rel Index    


 


Troponin I    (0.000-0.034)  ng/mL


 


Total Protein    (6.3-8.2)  g/dL


 


Albumin    (3.5-5.0)  g/dL


 


Stool Occult Blood   Negative  (Negative)  














- Radiology Data


Radiology results: report reviewed (Computed tomography scan of the brain shows 

chronic small vessel ischemia.  Old parietal infarct.  No acute intercranial 

abnormality.), image reviewed (Two-view chest x-ray shows left upper lobe 

density.)





Disposition


Clinical Impression: 


 Ecchymosis, Pneumonia





Disposition: HOME SELF-CARE


Condition: Stable


Instructions:  Ecchymosis (ED), Pneumonia (ED)


Additional Instructions: 


Continued Coumadin 2.5 mg daily until follow-up. please follow-up with your 

doctor in the next day or 2 for recheck.  You will need to have blood levels 

redrawn as well as repeat chest x-ray.  Return for bleeding increased bruising, 

difficulty breathing, fevers, worsening symptoms or other concerns.


Prescriptions: 


Moxifloxacin HCl [Avelox] 400 mg PO DAILY #7 tablet


Referrals: 


Russell Cortes MD [Primary Care Provider] - 1-2 days


Time of Disposition: 21:19

## 2017-11-26 NOTE — XR
EXAMINATION TYPE: XR chest 2V

 

DATE OF EXAM: 11/26/2017

 

COMPARISON: 7/1/2017

 

HISTORY: Cough

 

TECHNIQUE:  Frontal and lateral views of the chest are obtained.

 

FINDINGS:  Heart size is normal. There is a poorly marginated 2 cm area of increased density over the
 left upper lobe and scapula. The other lung fields are clear. There are diaphragm is normal. There i
s vertebroplasty of T12 noted. There are chest leads. Mediastinum WITHIN normal limits.

 

IMPRESSION:  Possible left upper lobe density that is new compared to last exam could be an infiltrat
e. Shallow oblique views would be helpful for further evaluation if clinically indicated.

## 2018-01-18 ENCOUNTER — HOSPITAL ENCOUNTER (EMERGENCY)
Dept: HOSPITAL 47 - EC | Age: 78
Discharge: HOME | End: 2018-01-18
Payer: MEDICARE

## 2018-01-18 VITALS
SYSTOLIC BLOOD PRESSURE: 161 MMHG | TEMPERATURE: 98.2 F | HEART RATE: 62 BPM | RESPIRATION RATE: 20 BRPM | DIASTOLIC BLOOD PRESSURE: 75 MMHG

## 2018-01-18 DIAGNOSIS — Z79.82: ICD-10-CM

## 2018-01-18 DIAGNOSIS — E07.9: ICD-10-CM

## 2018-01-18 DIAGNOSIS — Z91.048: ICD-10-CM

## 2018-01-18 DIAGNOSIS — Z79.01: ICD-10-CM

## 2018-01-18 DIAGNOSIS — Z91.013: ICD-10-CM

## 2018-01-18 DIAGNOSIS — J40: Primary | ICD-10-CM

## 2018-01-18 DIAGNOSIS — F17.200: ICD-10-CM

## 2018-01-18 DIAGNOSIS — Z91.018: ICD-10-CM

## 2018-01-18 DIAGNOSIS — Z91.040: ICD-10-CM

## 2018-01-18 DIAGNOSIS — H18.51: ICD-10-CM

## 2018-01-18 DIAGNOSIS — F41.9: ICD-10-CM

## 2018-01-18 DIAGNOSIS — Z53.20: ICD-10-CM

## 2018-01-18 DIAGNOSIS — I25.10: ICD-10-CM

## 2018-01-18 DIAGNOSIS — I25.2: ICD-10-CM

## 2018-01-18 DIAGNOSIS — I10: ICD-10-CM

## 2018-01-18 DIAGNOSIS — E78.5: ICD-10-CM

## 2018-01-18 DIAGNOSIS — Z88.5: ICD-10-CM

## 2018-01-18 DIAGNOSIS — Z88.0: ICD-10-CM

## 2018-01-18 DIAGNOSIS — J44.9: ICD-10-CM

## 2018-01-18 DIAGNOSIS — Z86.73: ICD-10-CM

## 2018-01-18 DIAGNOSIS — Z79.899: ICD-10-CM

## 2018-01-18 DIAGNOSIS — Z95.5: ICD-10-CM

## 2018-01-18 DIAGNOSIS — Z88.1: ICD-10-CM

## 2018-01-18 DIAGNOSIS — I48.91: ICD-10-CM

## 2018-01-18 LAB
ALBUMIN SERPL-MCNC: 4.1 G/DL (ref 3.5–5)
ALP SERPL-CCNC: 90 U/L (ref 38–126)
ALT SERPL-CCNC: 30 U/L (ref 9–52)
ANION GAP SERPL CALC-SCNC: 10 MMOL/L
APTT BLD: 28 SEC (ref 22–30)
AST SERPL-CCNC: 37 U/L (ref 14–36)
BASOPHILS # BLD AUTO: 0 K/UL (ref 0–0.2)
BASOPHILS NFR BLD AUTO: 0 %
BUN SERPL-SCNC: 14 MG/DL (ref 7–17)
CALCIUM SPEC-MCNC: 9.2 MG/DL (ref 8.4–10.2)
CHLORIDE SERPL-SCNC: 104 MMOL/L (ref 98–107)
CK SERPL-CCNC: 65 U/L (ref 30–135)
CO2 SERPL-SCNC: 23 MMOL/L (ref 22–30)
EOSINOPHIL # BLD AUTO: 0.1 K/UL (ref 0–0.7)
EOSINOPHIL NFR BLD AUTO: 2 %
ERYTHROCYTE [DISTWIDTH] IN BLOOD BY AUTOMATED COUNT: 4.6 M/UL (ref 3.8–5.4)
ERYTHROCYTE [DISTWIDTH] IN BLOOD: 15 % (ref 11.5–15.5)
GLUCOSE SERPL-MCNC: 87 MG/DL (ref 74–99)
HCT VFR BLD AUTO: 43.7 % (ref 34–46)
HGB BLD-MCNC: 13.3 GM/DL (ref 11.4–16)
INR PPP: 3.3 (ref ?–1.2)
LYMPHOCYTES # SPEC AUTO: 2.2 K/UL (ref 1–4.8)
LYMPHOCYTES NFR SPEC AUTO: 30 %
MAGNESIUM SPEC-SCNC: 1.8 MG/DL (ref 1.6–2.3)
MCH RBC QN AUTO: 28.9 PG (ref 25–35)
MCHC RBC AUTO-ENTMCNC: 30.4 G/DL (ref 31–37)
MCV RBC AUTO: 94.9 FL (ref 80–100)
MONOCYTES # BLD AUTO: 0.5 K/UL (ref 0–1)
MONOCYTES NFR BLD AUTO: 7 %
NEUTROPHILS # BLD AUTO: 4.2 K/UL (ref 1.3–7.7)
NEUTROPHILS NFR BLD AUTO: 59 %
PLATELET # BLD AUTO: 259 K/UL (ref 150–450)
POTASSIUM SERPL-SCNC: 4.4 MMOL/L (ref 3.5–5.1)
PROT SERPL-MCNC: 7.2 G/DL (ref 6.3–8.2)
PT BLD: 29.5 SEC (ref 9–12)
SODIUM SERPL-SCNC: 137 MMOL/L (ref 137–145)
TROPONIN I SERPL-MCNC: <0.012 NG/ML (ref 0–0.03)
WBC # BLD AUTO: 7.2 K/UL (ref 3.8–10.6)

## 2018-01-18 PROCEDURE — 36415 COLL VENOUS BLD VENIPUNCTURE: CPT

## 2018-01-18 PROCEDURE — 71046 X-RAY EXAM CHEST 2 VIEWS: CPT

## 2018-01-18 PROCEDURE — 83735 ASSAY OF MAGNESIUM: CPT

## 2018-01-18 PROCEDURE — 96372 THER/PROPH/DIAG INJ SC/IM: CPT

## 2018-01-18 PROCEDURE — 85610 PROTHROMBIN TIME: CPT

## 2018-01-18 PROCEDURE — 99285 EMERGENCY DEPT VISIT HI MDM: CPT

## 2018-01-18 PROCEDURE — 80320 DRUG SCREEN QUANTALCOHOLS: CPT

## 2018-01-18 PROCEDURE — 93005 ELECTROCARDIOGRAM TRACING: CPT

## 2018-01-18 PROCEDURE — 84484 ASSAY OF TROPONIN QUANT: CPT

## 2018-01-18 PROCEDURE — 87077 CULTURE AEROBIC IDENTIFY: CPT

## 2018-01-18 PROCEDURE — 87186 SC STD MICRODIL/AGAR DIL: CPT

## 2018-01-18 PROCEDURE — 80053 COMPREHEN METABOLIC PANEL: CPT

## 2018-01-18 PROCEDURE — 82553 CREATINE MB FRACTION: CPT

## 2018-01-18 PROCEDURE — 87502 INFLUENZA DNA AMP PROBE: CPT

## 2018-01-18 PROCEDURE — 87040 BLOOD CULTURE FOR BACTERIA: CPT

## 2018-01-18 PROCEDURE — 94640 AIRWAY INHALATION TREATMENT: CPT

## 2018-01-18 PROCEDURE — 83605 ASSAY OF LACTIC ACID: CPT

## 2018-01-18 PROCEDURE — 85025 COMPLETE CBC W/AUTO DIFF WBC: CPT

## 2018-01-18 PROCEDURE — 82550 ASSAY OF CK (CPK): CPT

## 2018-01-18 PROCEDURE — 85730 THROMBOPLASTIN TIME PARTIAL: CPT

## 2018-01-18 PROCEDURE — 70450 CT HEAD/BRAIN W/O DYE: CPT

## 2018-01-18 NOTE — XR
EXAMINATION TYPE: XR chest 2V

 

DATE OF EXAM: 1/18/2018

 

COMPARISON: 11/26/2017

 

HISTORY: Short of breath

 

TECHNIQUE:  Frontal and lateral views of the chest are obtained.

 

FINDINGS:  There is no heart failure nor confluent pneumonic infiltrate. Costophrenic angles are herman
r. There is some pulmonary hyperinflation. Thoracic aorta is atheromatous. There is apparent old vert
ebroplasty at T12.

 

IMPRESSION:  No active cardiopulmonary disease. No change. There is probably some COPD.

## 2018-01-18 NOTE — ED
General Adult HPI





- General


Chief complaint: Dizziness


Stated complaint: Dizzy


Time Seen by Provider: 18 15:44


Source: patient, RN notes reviewed, old records reviewed


Mode of arrival: wheelchair


Limitations: no limitations





- History of Present Illness


Initial comments: 





77-year-old female presenting with chief complaint of cough and congestion.  

Patient is a current smoker.  She went to urgent care complaining of these 

symptoms, also complained of some blurry vision and was sent in for evaluation.

  Patient has past medical history of Fuch disease of the cornea.  She does see 

an ophthalmologist for this.  Blurry vision has been present for months.  

Denies any sudden worsening of her vision.  She also complains of some 

dizziness.  She is currently on Coumadin for history of multiple CVA.  Denies 

any chest pain.  Denies abdominal pain.  Denies nausea vomiting or diarrhea.  

Denies focal weakness.





- Related Data


 Home Medications











 Medication  Instructions  Recorded  Confirmed


 


Sertraline HCl [Zoloft] 200 mg PO DAILY 02/17/15 01/18/18


 


Levothyroxine Sodium [Synthroid] 75 mcg PO DAILY 04/28/15 01/18/18


 


Montelukast [Singulair] 10 mg PO HS 04/28/15 01/18/18


 


Atorvastatin [Lipitor] 40 mg PO HS 16


 


Gabapentin [Neurontin] 1,200 mg PO BID 16


 


Aspirin EC [Ecotrin Low Dose] 81 mg PO DAILY 17


 


Cyclobenzaprine HCl 10 mg PO HS 17


 


Metoprolol Tartrate [Lopressor] 25 mg PO BID 18


 


Warfarin [Coumadin] 2.5 mg PO HS 18


 


amLODIPine [Norvasc] 5 mg PO DAILY PRN 18








 Previous Rx's











 Medication  Instructions  Recorded


 


Azithromycin [Zithromax Z-pack] 0 mg PO AS DIRECTED #6 tab 18


 


methylPREDNISolone Dose Pack 4 mg PO AS DIRECTED #21 package 18





[Medrol Dose Pack]  











 Allergies











Allergy/AdvReac Type Severity Reaction Status Date / Time


 


iodine Allergy Severe throat Verified 18 16:08





   Swelling  


 


latex Allergy Severe throat Verified 18 16:08





   swelling  


 


banana Allergy  Unknown Verified 18 16:08


 


Citrus And Derivatives Allergy  Unknown Verified 18 16:08





[Citrus]     


 


codeine Allergy  Nausea & Verified 18 16:08





   Vomiting  


 


Penicillins Allergy  Rash/Hives Verified 18 16:08


 


doxycycline AdvReac  Nausea & Verified 18 16:08





   Vomiting  


 


seafood Allergy  Unknown Uncoded 18 16:08














Review of Systems


ROS Statement: 


Those systems with pertinent positive or pertinent negative responses have been 

documented in the HPI.





ROS Other: All systems not noted in ROS Statement are negative.





Past Medical History


Past Medical History: Atrial Fibrillation, Asthma, Coronary Artery Disease (CAD)

, Chest Pain / Angina, COPD, CVA/TIA, Hyperlipidemia, Hypertension, Myocardial 

Infarction (MI), Osteoarthritis (OA), Thyroid Disorder, Vascular Disorder


Additional Past Medical History / Comment(s): LUPUS , RIGHT SIDED WEAKNESS and 

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

" spells and often falls


Last Myocardial Infarction Date:: 2012


History of Any Multi-Drug Resistant Organisms: None Reported


Past Surgical History: Appendectomy, Heart Catheterization With Stent, 

Orthopedic Surgery, Tonsillectomy


Additional Past Surgical History / Comment(s): 3 cardiac stents, PTCA   tremaine 

carotid endarterectomy, laparoscopy-adhesions removed. tremaine arthroscopic knee 

surgery, bilateral cataract removal,


Past Anesthesia/Blood Transfusion Reactions: No Reported Reaction


Additional Past Anesthesia/Blood Transfusion Reaction / Comment(s): .


Date of Last Stent Placement:: (not sure month)


Past Psychological History: Anxiety


Smoking Status: Current every day smoker


Past Alcohol Use History: None Reported


Past Drug Use History: None Reported





- Past Family History


  ** Father


Family Medical History: Cancer


Additional Family Medical History / Comment(s): Father had massive MI at age 66 

yrs.  He  from CVA at age 79yrs.





  ** Mother


Family Medical History: CVA/TIA, Deep Vein Thrombosis (DVT), Pulmonary Embolus


Additional Family Medical History / Comment(s): Mother  of CVA at age 79 

yrs.





General Exam


Limitations: no limitations


General appearance: alert, appears intoxicated


Head exam: Present: atraumatic, normocephalic


Eye exam: Present: normal appearance, other (Bilateral corneal clouding worse 

on the right)


ENT exam: Present: normal exam


Neck exam: Present: normal inspection.  Absent: tenderness, meningismus


Respiratory exam: Present: respiratory distress, wheezes, prolonged expiratory.

  Absent: normal lung sounds bilaterally


Cardiovascular Exam: Present: regular rate, normal rhythm


GI/Abdominal exam: Present: soft.  Absent: distended, tenderness, guarding


Extremities exam: Present: normal inspection, normal capillary refill.  Absent: 

pedal edema, calf tenderness


Neurological exam: Present: alert, oriented X3, CN II-XII intact.  Absent: 

motor sensory deficit


Psychiatric exam: Present: normal affect, normal mood


Skin exam: Present: warm, dry, intact.  Absent: cyanosis, diaphoretic





Course


 Vital Signs











  18





  15:27 16:21 16:36


 


Temperature 97.8 F  


 


Pulse Rate 62 98 99


 


Respiratory 18 18 18





Rate   


 


Blood Pressure 202/81  


 


O2 Sat by Pulse 97  





Oximetry   














  18





  17:29 18:50


 


Temperature 98.6 F 98.2 F


 


Pulse Rate 98 62


 


Respiratory 18 20





Rate  


 


Blood Pressure 191/81 161/75


 


O2 Sat by Pulse 98 100





Oximetry  














EKG Findings





- EKG Comments:


EKG Findings:: EKG shows sinus bradycardia, rate of 58, left atrial enlargement

, ND interval 172, castration 74, , no signs of ischemia





Medical Decision Making





- Medical Decision Making





77-year-old female with cough and dyspnea.  She is a current smoker.  Patient 

also complained of blurry vision which is chronic.  She has a corneal disease 

and seasonal ophthalmologist for this.  There is corneal haziness bilaterally 

which is worse on the right.  Vision in the right eye is severely impaired.  

Intraocular pressures both right and left are normal, ranging between 12 and 

15.  Laboratory studies are obtained, normal CBC, INR is 3.3, patient is 

instructed to hold 1 dose of Coumadin.  Influenza negative, troponin negative.  

Chest x-ray is negative for focal pneumonia.  CT of the brain shows old left 

parietal infarct, no acute changes.  Patient will be treated for acute 

bronchitis.  She will follow-up with her primary care physician and her 

ophthalmologist.





- Lab Data


Result diagrams: 


 18 15:50





 18 15:50


 Lab Results











  18 Range/Units





  15:50 15:50 15:50 


 


WBC   7.2   (3.8-10.6)  k/uL


 


RBC   4.60   (3.80-5.40)  m/uL


 


Hgb   13.3   (11.4-16.0)  gm/dL


 


Hct   43.7   (34.0-46.0)  %


 


MCV   94.9   (80.0-100.0)  fL


 


MCH   28.9   (25.0-35.0)  pg


 


MCHC   30.4 L   (31.0-37.0)  g/dL


 


RDW   15.0   (11.5-15.5)  %


 


Plt Count   259   (150-450)  k/uL


 


Neutrophils %   59   %


 


Lymphocytes %   30   %


 


Monocytes %   7   %


 


Eosinophils %   2   %


 


Basophils %   0   %


 


Neutrophils #   4.2   (1.3-7.7)  k/uL


 


Lymphocytes #   2.2   (1.0-4.8)  k/uL


 


Monocytes #   0.5   (0-1.0)  k/uL


 


Eosinophils #   0.1   (0-0.7)  k/uL


 


Basophils #   0.0   (0-0.2)  k/uL


 


Hypochromasia   Slight   


 


PT     (9.0-12.0)  sec


 


INR     (<1.2)  


 


APTT     (22.0-30.0)  sec


 


Sodium    137  (137-145)  mmol/L


 


Potassium    4.4  (3.5-5.1)  mmol/L


 


Chloride    104  ()  mmol/L


 


Carbon Dioxide    23  (22-30)  mmol/L


 


Anion Gap    10  mmol/L


 


BUN    14  (7-17)  mg/dL


 


Creatinine    1.10 H  (0.52-1.04)  mg/dL


 


Est GFR (MDRD) Af Amer    58  (>60 ml/min/1.73 sqM)  


 


Est GFR (MDRD) Non-Af    48  (>60 ml/min/1.73 sqM)  


 


Glucose    87  (74-99)  mg/dL


 


Plasma Lactic Acid Dudley     (0.7-2.0)  mmol/L


 


Calcium    9.2  (8.4-10.2)  mg/dL


 


Magnesium    1.8  (1.6-2.3)  mg/dL


 


Total Bilirubin    0.4  (0.2-1.3)  mg/dL


 


AST    37 H  (14-36)  U/L


 


ALT    30  (9-52)  U/L


 


Alkaline Phosphatase    90  ()  U/L


 


Total Creatine Kinase  65    ()  U/L


 


CK-MB (CK-2)  0.9    (0.0-2.4)  ng/mL


 


CK-MB (CK-2) Rel Index  1.4    


 


Troponin I  <0.012    (0.000-0.034)  ng/mL


 


Total Protein    7.2  (6.3-8.2)  g/dL


 


Albumin    4.1  (3.5-5.0)  g/dL


 


Serum Alcohol    <10  mg/dL


 


Influenza Type A RNA     (Not Detectd)  


 


Influenza Type B (PCR)     (Not Detectd)  














  18 Range/Units





  15:50 15:50 17:30 


 


WBC     (3.8-10.6)  k/uL


 


RBC     (3.80-5.40)  m/uL


 


Hgb     (11.4-16.0)  gm/dL


 


Hct     (34.0-46.0)  %


 


MCV     (80.0-100.0)  fL


 


MCH     (25.0-35.0)  pg


 


MCHC     (31.0-37.0)  g/dL


 


RDW     (11.5-15.5)  %


 


Plt Count     (150-450)  k/uL


 


Neutrophils %     %


 


Lymphocytes %     %


 


Monocytes %     %


 


Eosinophils %     %


 


Basophils %     %


 


Neutrophils #     (1.3-7.7)  k/uL


 


Lymphocytes #     (1.0-4.8)  k/uL


 


Monocytes #     (0-1.0)  k/uL


 


Eosinophils #     (0-0.7)  k/uL


 


Basophils #     (0-0.2)  k/uL


 


Hypochromasia     


 


PT  29.5 H    (9.0-12.0)  sec


 


INR  3.3 H    (<1.2)  


 


APTT  28.0    (22.0-30.0)  sec


 


Sodium     (137-145)  mmol/L


 


Potassium     (3.5-5.1)  mmol/L


 


Chloride     ()  mmol/L


 


Carbon Dioxide     (22-30)  mmol/L


 


Anion Gap     mmol/L


 


BUN     (7-17)  mg/dL


 


Creatinine     (0.52-1.04)  mg/dL


 


Est GFR (MDRD) Af Amer     (>60 ml/min/1.73 sqM)  


 


Est GFR (MDRD) Non-Af     (>60 ml/min/1.73 sqM)  


 


Glucose     (74-99)  mg/dL


 


Plasma Lactic Acid Dudley   0.8   (0.7-2.0)  mmol/L


 


Calcium     (8.4-10.2)  mg/dL


 


Magnesium     (1.6-2.3)  mg/dL


 


Total Bilirubin     (0.2-1.3)  mg/dL


 


AST     (14-36)  U/L


 


ALT     (9-52)  U/L


 


Alkaline Phosphatase     ()  U/L


 


Total Creatine Kinase     ()  U/L


 


CK-MB (CK-2)     (0.0-2.4)  ng/mL


 


CK-MB (CK-2) Rel Index     


 


Troponin I     (0.000-0.034)  ng/mL


 


Total Protein     (6.3-8.2)  g/dL


 


Albumin     (3.5-5.0)  g/dL


 


Serum Alcohol     mg/dL


 


Influenza Type A RNA    Not Detected  (Not Detectd)  


 


Influenza Type B (PCR)    Not Detected  (Not Detectd)  














Disposition


Clinical Impression: 


 Bronchitis, Fuchs' corneal dystrophy





Disposition: HOME SELF-CARE


Condition: Good


Instructions:  Acute Bronchitis (ED)


Additional Instructions: 


Please follow up with ophthalmology.


Prescriptions: 


Azithromycin [Zithromax Z-pack] 0 mg PO AS DIRECTED #6 tab


methylPREDNISolone Dose Pack [Medrol Dose Pack] 4 mg PO AS DIRECTED #21 package


Referrals: 


Russell Cortes MD [Primary Care Provider] - 1-2 days


Time of Disposition: 19:08

## 2018-01-18 NOTE — CT
EXAMINATION TYPE: CT brain wo con

 

DATE OF EXAM: 1/18/2018

 

COMPARISON: 11/26/2017

 

HISTORY: Dizziness.

 

CT DLP: 971 mGycm

Automated exposure control for dose reduction was used.

 

FINDINGS: 

There is cerebral cortical atrophy. There is no mass effect nor midline shift. There is no sign of in
tracranial hemorrhage. There is a 5 x 3 cm area of hypodensity in the left parietal lobe gray and whi
te matter consistent with old infarct. The calvarium is intact.

 

IMPRESSION: 

CEREBRAL ATROPHY. 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 CHRONIC SMALL VESSEL ISCHEMIA. NO CHANGE COMPAR
ED TO OLD EXAM.

## 2018-03-01 ENCOUNTER — HOSPITAL ENCOUNTER (EMERGENCY)
Dept: HOSPITAL 47 - EC | Age: 78
Discharge: HOME | End: 2018-03-01
Payer: MEDICARE

## 2018-03-01 VITALS
RESPIRATION RATE: 20 BRPM | SYSTOLIC BLOOD PRESSURE: 187 MMHG | TEMPERATURE: 97.4 F | DIASTOLIC BLOOD PRESSURE: 86 MMHG | HEART RATE: 77 BPM

## 2018-03-01 DIAGNOSIS — J44.9: ICD-10-CM

## 2018-03-01 DIAGNOSIS — Z91.018: ICD-10-CM

## 2018-03-01 DIAGNOSIS — Z79.82: ICD-10-CM

## 2018-03-01 DIAGNOSIS — Z88.0: ICD-10-CM

## 2018-03-01 DIAGNOSIS — I25.2: ICD-10-CM

## 2018-03-01 DIAGNOSIS — M19.90: ICD-10-CM

## 2018-03-01 DIAGNOSIS — I69.351: ICD-10-CM

## 2018-03-01 DIAGNOSIS — I25.10: ICD-10-CM

## 2018-03-01 DIAGNOSIS — I10: ICD-10-CM

## 2018-03-01 DIAGNOSIS — Z98.890: ICD-10-CM

## 2018-03-01 DIAGNOSIS — E78.5: ICD-10-CM

## 2018-03-01 DIAGNOSIS — F41.9: ICD-10-CM

## 2018-03-01 DIAGNOSIS — Z88.1: ICD-10-CM

## 2018-03-01 DIAGNOSIS — Z91.013: ICD-10-CM

## 2018-03-01 DIAGNOSIS — Z86.79: ICD-10-CM

## 2018-03-01 DIAGNOSIS — Z91.040: ICD-10-CM

## 2018-03-01 DIAGNOSIS — Z79.01: ICD-10-CM

## 2018-03-01 DIAGNOSIS — F17.200: ICD-10-CM

## 2018-03-01 DIAGNOSIS — I48.91: ICD-10-CM

## 2018-03-01 DIAGNOSIS — S05.02XA: Primary | ICD-10-CM

## 2018-03-01 DIAGNOSIS — E07.9: ICD-10-CM

## 2018-03-01 DIAGNOSIS — Z79.899: ICD-10-CM

## 2018-03-01 DIAGNOSIS — Z88.5: ICD-10-CM

## 2018-03-01 DIAGNOSIS — H18.51: ICD-10-CM

## 2018-03-01 DIAGNOSIS — Z91.048: ICD-10-CM
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
## 2018-03-01 PROCEDURE — 99283 EMERGENCY DEPT VISIT LOW MDM: CPT

## 2018-03-01 NOTE — ED
General Adult HPI





- General


Chief complaint: Eye Problems


Stated complaint: Eye Redness


Time Seen by Provider: 18 16:26


Source: patient


Mode of arrival: ambulatory


Limitations: no limitations





- History of Present Illness


Initial comments: 


77-year-old female presents for pain in the left eye starting yesterday at 

1800.  Patient states she was using a Kleenex to wipe her eye at this time when 

she suddenly noticed pain in the left eye.  She states her left eye has been 

"runny" feels like there is something in it.  It is sensitive to light and 

especially painful when she blinks.  Denies any loss of vision but does state 

her vision is blurry due to the lacrimation.  Denies any flashing lights or 

scintillations.  Denies any pressure behind the eye. Patient went to [facility] 

but could not tolerate an eye exam so was sent here.  Patient does have Fuch 

disease in the right eye and is awaiting a corneal transplant, currently 

managed by an ophthalmologist in [place].  States that after the 

numbing drop was administered to the left eye discomfort was relieved.  Patient 

states she is up-to-date on her tetanus.








- Related Data


 Home Medications











 Medication  Instructions  Recorded  Confirmed


 


Sertraline HCl [Zoloft] 200 mg PO DAILY 02/17/15 01/18/18


 


Levothyroxine Sodium [Synthroid] 75 mcg PO DAILY 04/28/15 01/18/18


 


Montelukast [Singulair] 10 mg PO HS 04/28/15 01/18/18


 


Atorvastatin [Lipitor] 40 mg PO HS 16


 


Gabapentin [Neurontin] 1,200 mg PO BID 16


 


Aspirin EC [Ecotrin Low Dose] 81 mg PO DAILY 17


 


Cyclobenzaprine HCl 10 mg PO HS 17


 


Metoprolol Tartrate [Lopressor] 25 mg PO BID 18


 


Warfarin [Coumadin] 2.5 mg PO HS 18


 


amLODIPine [Norvasc] 5 mg PO DAILY PRN 18








 Previous Rx's











 Medication  Instructions  Recorded


 


Azithromycin [Zithromax Z-pack] 0 mg PO AS DIRECTED #6 tab 18


 


methylPREDNISolone Dose Pack 4 mg PO AS DIRECTED #21 package 18





[Medrol Dose Pack]  


 


Tobramycin 0.3% Ophth Soln [Tobrex 1 drop LEFT EYE Q4H 7 Days  ml 18





0.3% Ophth Soln]  











 Allergies











Allergy/AdvReac Type Severity Reaction Status Date / Time


 


iodine Allergy Severe throat Verified 18 16:15





   Swelling  


 


latex Allergy Severe throat Verified 18 16:15





   swelling  


 


banana Allergy  Unknown Verified 18 16:15


 


Citrus And Derivatives Allergy  Unknown Verified 18 16:15





[Citrus]     


 


codeine Allergy  Nausea & Verified 18 16:15





   Vomiting  


 


Penicillins Allergy  Rash/Hives Verified 18 16:15


 


doxycycline AdvReac  Nausea & Verified 18 16:15





   Vomiting  


 


seafood Allergy  Unknown Uncoded 18 16:15














Review of Systems


ROS Statement: 


Those systems with pertinent positive or pertinent negative responses have been 

documented in the HPI.





ROS Other: All systems not noted in ROS Statement are negative.





Past Medical History


Past Medical History: Atrial Fibrillation, Asthma, Coronary Artery Disease (CAD)

, Chest Pain / Angina, COPD, CVA/TIA, Hyperlipidemia, Hypertension, Myocardial 

Infarction (MI), Osteoarthritis (OA), Thyroid Disorder, Vascular Disorder


Additional Past Medical History / Comment(s): LUPUS , RIGHT SIDED WEAKNESS and 

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

" spells and often falls


Last Myocardial Infarction Date:: 2012


History of Any Multi-Drug Resistant Organisms: None Reported


Past Surgical History: Appendectomy, Heart Catheterization With Stent, 

Orthopedic Surgery, Tonsillectomy


Additional Past Surgical History / Comment(s): 3 cardiac stents, PTCA   tremaine 

carotid endarterectomy, laparoscopy-adhesions removed. tremaine arthroscopic knee 

surgery, bilateral cataract removal,


Past Anesthesia/Blood Transfusion Reactions: No Reported Reaction


Additional Past Anesthesia/Blood Transfusion Reaction / Comment(s): .


Date of Last Stent Placement:: (not sure month)


Past Psychological History: Anxiety


Smoking Status: Current every day smoker


Past Alcohol Use History: None Reported


Past Drug Use History: None Reported





- Past Family History


  ** Father


Family Medical History: Cancer


Additional Family Medical History / Comment(s): Father had massive MI at age 66 

yrs.  He  from CVA at age 79yrs.





  ** Mother


Family Medical History: CVA/TIA, Deep Vein Thrombosis (DVT), Pulmonary Embolus


Additional Family Medical History / Comment(s): Mother  of CVA at age 79 

yrs.





General Exam


Limitations: no limitations


General appearance: alert, in no apparent distress


Head exam: Present: atraumatic, normocephalic, normal inspection


Eye exam: Present: PERRL, EOMI, conjunctival injection, other (Corneal abrasion 

noted on Mario lamp at 5:00 on left eye.  Patient had relief after the 

proparacaine drop was administered to the left eye).  Absent: scleral icterus, 

nystagmus, periorbital swelling, periorbital tenderness


ENT exam: Present: normal exam, mucous membranes moist


Neurological exam: Present: alert, oriented X3


Psychiatric exam: Present: normal affect, normal mood





Course





 Vital Signs











  18





  16:13


 


Temperature 97.4 F L


 


Pulse Rate 77


 


Respiratory 20





Rate 


 


Blood Pressure 187/86


 


O2 Sat by Pulse 99





Oximetry 














Procedures





- Procedures


Initial comment: 





A drop of proparacaine was administered to the left eye.  After that, 

fluorescing dye was administered to the left eye.  A Woods lamp was used to 

visualize the cornea and an abrasion was noted on the left eye at 5:00.  

Patient did feel relief after the proparacaine drop was administered.





Medical Decision Making





- Medical Decision Making





77-year-old female presented to the emergency department for pain in the left 

eyes starting last night.  Patient did use Kleenex on IN pain started shortly 

after that.  She is sensitive to light but denies any scintillations or loss of 

vision.  He does have some blurring of the vision in her left eye.  She has a 

history of Fuch disease and a right eye so will be referred to ophtho on-call. 

Will be administed antibiotic drops in the meantime.





Disposition


Clinical Impression: 


 Corneal abrasion, left





Disposition: HOME SELF-CARE


Condition: Good


Instructions:  Abrasion (ED)


Additional Instructions: 


Administer antibiotics to the left eye as directed.  Follow up with 

ophthalmology.  Return to emergency department if symptoms worsen or do not 

improve.


Prescriptions: 


Tobramycin 0.3% Ophth Soln [Tobrex 0.3% Ophth Soln] 1 drop LEFT EYE Q4H 7 Days  

ml


Referrals: 


Russell Cortes MD [Primary Care Provider] - 1-2 days


Time of Disposition: 17:08

## 2018-03-12 ENCOUNTER — HOSPITAL ENCOUNTER (OUTPATIENT)
Dept: HOSPITAL 47 - EC | Age: 78
Setting detail: OBSERVATION
LOS: 3 days | Discharge: HOME | End: 2018-03-15
Payer: MEDICARE

## 2018-03-12 DIAGNOSIS — N18.3: ICD-10-CM

## 2018-03-12 DIAGNOSIS — Z92.21: ICD-10-CM

## 2018-03-12 DIAGNOSIS — Z91.018: ICD-10-CM

## 2018-03-12 DIAGNOSIS — M32.9: ICD-10-CM

## 2018-03-12 DIAGNOSIS — I12.9: ICD-10-CM

## 2018-03-12 DIAGNOSIS — R45.1: ICD-10-CM

## 2018-03-12 DIAGNOSIS — J45.909: ICD-10-CM

## 2018-03-12 DIAGNOSIS — M81.0: ICD-10-CM

## 2018-03-12 DIAGNOSIS — Z95.9: ICD-10-CM

## 2018-03-12 DIAGNOSIS — I73.1: ICD-10-CM

## 2018-03-12 DIAGNOSIS — F05: ICD-10-CM

## 2018-03-12 DIAGNOSIS — F41.9: ICD-10-CM

## 2018-03-12 DIAGNOSIS — Z79.01: ICD-10-CM

## 2018-03-12 DIAGNOSIS — I73.9: ICD-10-CM

## 2018-03-12 DIAGNOSIS — Z88.0: ICD-10-CM

## 2018-03-12 DIAGNOSIS — F32.9: ICD-10-CM

## 2018-03-12 DIAGNOSIS — F17.200: ICD-10-CM

## 2018-03-12 DIAGNOSIS — D64.9: ICD-10-CM

## 2018-03-12 DIAGNOSIS — Z88.6: ICD-10-CM

## 2018-03-12 DIAGNOSIS — Z88.5: ICD-10-CM

## 2018-03-12 DIAGNOSIS — I48.0: ICD-10-CM

## 2018-03-12 DIAGNOSIS — Z95.5: ICD-10-CM

## 2018-03-12 DIAGNOSIS — G62.9: ICD-10-CM

## 2018-03-12 DIAGNOSIS — Z78.1: ICD-10-CM

## 2018-03-12 DIAGNOSIS — N17.9: ICD-10-CM

## 2018-03-12 DIAGNOSIS — I69.351: ICD-10-CM

## 2018-03-12 DIAGNOSIS — M19.90: ICD-10-CM

## 2018-03-12 DIAGNOSIS — N39.0: ICD-10-CM

## 2018-03-12 DIAGNOSIS — J44.9: ICD-10-CM

## 2018-03-12 DIAGNOSIS — I25.2: ICD-10-CM

## 2018-03-12 DIAGNOSIS — Z79.82: ICD-10-CM

## 2018-03-12 DIAGNOSIS — Z85.72: ICD-10-CM

## 2018-03-12 DIAGNOSIS — Z91.048: ICD-10-CM

## 2018-03-12 DIAGNOSIS — Z91.013: ICD-10-CM

## 2018-03-12 DIAGNOSIS — Z91.040: ICD-10-CM

## 2018-03-12 DIAGNOSIS — I25.10: ICD-10-CM

## 2018-03-12 DIAGNOSIS — G89.29: ICD-10-CM

## 2018-03-12 DIAGNOSIS — Z88.1: ICD-10-CM

## 2018-03-12 DIAGNOSIS — Z79.899: ICD-10-CM

## 2018-03-12 DIAGNOSIS — G93.41: Primary | ICD-10-CM

## 2018-03-12 DIAGNOSIS — M54.9: ICD-10-CM

## 2018-03-12 DIAGNOSIS — E86.0: ICD-10-CM

## 2018-03-12 DIAGNOSIS — K21.9: ICD-10-CM

## 2018-03-12 DIAGNOSIS — E03.9: ICD-10-CM

## 2018-03-12 DIAGNOSIS — E78.5: ICD-10-CM

## 2018-03-12 PROCEDURE — 71045 X-RAY EXAM CHEST 1 VIEW: CPT

## 2018-03-12 PROCEDURE — 80320 DRUG SCREEN QUANTALCOHOLS: CPT

## 2018-03-12 PROCEDURE — 80306 DRUG TEST PRSMV INSTRMNT: CPT

## 2018-03-12 PROCEDURE — 84443 ASSAY THYROID STIM HORMONE: CPT

## 2018-03-12 PROCEDURE — 85610 PROTHROMBIN TIME: CPT

## 2018-03-12 PROCEDURE — 93005 ELECTROCARDIOGRAM TRACING: CPT

## 2018-03-12 PROCEDURE — 82553 CREATINE MB FRACTION: CPT

## 2018-03-12 PROCEDURE — 80053 COMPREHEN METABOLIC PANEL: CPT

## 2018-03-12 PROCEDURE — 81001 URINALYSIS AUTO W/SCOPE: CPT

## 2018-03-12 PROCEDURE — 96374 THER/PROPH/DIAG INJ IV PUSH: CPT

## 2018-03-12 PROCEDURE — 83690 ASSAY OF LIPASE: CPT

## 2018-03-12 PROCEDURE — 36600 WITHDRAWAL OF ARTERIAL BLOOD: CPT

## 2018-03-12 PROCEDURE — 82140 ASSAY OF AMMONIA: CPT

## 2018-03-12 PROCEDURE — 96372 THER/PROPH/DIAG INJ SC/IM: CPT

## 2018-03-12 PROCEDURE — 36415 COLL VENOUS BLD VENIPUNCTURE: CPT

## 2018-03-12 PROCEDURE — 85730 THROMBOPLASTIN TIME PARTIAL: CPT

## 2018-03-12 PROCEDURE — 82550 ASSAY OF CK (CPK): CPT

## 2018-03-12 PROCEDURE — 84484 ASSAY OF TROPONIN QUANT: CPT

## 2018-03-12 PROCEDURE — 96376 TX/PRO/DX INJ SAME DRUG ADON: CPT

## 2018-03-12 PROCEDURE — 87086 URINE CULTURE/COLONY COUNT: CPT

## 2018-03-12 PROCEDURE — 96375 TX/PRO/DX INJ NEW DRUG ADDON: CPT

## 2018-03-12 PROCEDURE — 99285 EMERGENCY DEPT VISIT HI MDM: CPT

## 2018-03-12 PROCEDURE — 83605 ASSAY OF LACTIC ACID: CPT

## 2018-03-12 PROCEDURE — 96361 HYDRATE IV INFUSION ADD-ON: CPT

## 2018-03-12 PROCEDURE — 82150 ASSAY OF AMYLASE: CPT

## 2018-03-12 PROCEDURE — 85025 COMPLETE CBC W/AUTO DIFF WBC: CPT

## 2018-03-12 PROCEDURE — 70450 CT HEAD/BRAIN W/O DYE: CPT

## 2018-03-12 PROCEDURE — 83520 IMMUNOASSAY QUANT NOS NONAB: CPT

## 2018-03-12 PROCEDURE — 83735 ASSAY OF MAGNESIUM: CPT

## 2018-03-12 PROCEDURE — 82805 BLOOD GASES W/O2 SATURATION: CPT

## 2018-03-13 LAB
ALBUMIN SERPL-MCNC: 3.8 G/DL (ref 3.5–5)
ALP SERPL-CCNC: 96 U/L (ref 38–126)
ALT SERPL-CCNC: 21 U/L (ref 9–52)
AMMONIA PLAS-SCNC: 17 UMOL/L (ref ?–30)
AMYLASE SERPL-CCNC: 97 U/L (ref 30–110)
ANION GAP SERPL CALC-SCNC: 13 MMOL/L
APAP SPEC-MCNC: <10 UG/ML
APTT BLD: 29.7 SEC (ref 22–30)
AST SERPL-CCNC: 30 U/L (ref 14–36)
BASOPHILS # BLD AUTO: 0 K/UL (ref 0–0.2)
BASOPHILS NFR BLD AUTO: 0 %
BUN SERPL-SCNC: 19 MG/DL (ref 7–17)
CALCIUM SPEC-MCNC: 9.5 MG/DL (ref 8.4–10.2)
CHLORIDE SERPL-SCNC: 104 MMOL/L (ref 98–107)
CK SERPL-CCNC: 86 U/L (ref 30–135)
CO2 BLDA-SCNC: 23 MMOL/L (ref 19–24)
CO2 SERPL-SCNC: 19 MMOL/L (ref 22–30)
EOSINOPHIL # BLD AUTO: 0 K/UL (ref 0–0.7)
EOSINOPHIL NFR BLD AUTO: 0 %
ERYTHROCYTE [DISTWIDTH] IN BLOOD BY AUTOMATED COUNT: 4.21 M/UL (ref 3.8–5.4)
ERYTHROCYTE [DISTWIDTH] IN BLOOD: 16.2 % (ref 11.5–15.5)
GLUCOSE BLD-MCNC: 126 MG/DL (ref 75–99)
GLUCOSE BLD-MCNC: 98 MG/DL (ref 75–99)
GLUCOSE SERPL-MCNC: 119 MG/DL (ref 74–99)
HCO3 BLDA-SCNC: 21 MMOL/L (ref 21–25)
HCT VFR BLD AUTO: 37.2 % (ref 34–46)
HGB BLD-MCNC: 11.8 GM/DL (ref 11.4–16)
HYALINE CASTS UR QL AUTO: 1 /LPF (ref 0–2)
INR PPP: 2.2 (ref ?–1.2)
LACTATE BLDV-SCNC: 1.2 MMOL/L (ref 0.7–2)
LIPASE SERPL-CCNC: 125 U/L (ref 23–300)
LYMPHOCYTES # SPEC AUTO: 1.4 K/UL (ref 1–4.8)
LYMPHOCYTES NFR SPEC AUTO: 15 %
MAGNESIUM SPEC-SCNC: 1.6 MG/DL (ref 1.6–2.3)
MCH RBC QN AUTO: 28.1 PG (ref 25–35)
MCHC RBC AUTO-ENTMCNC: 31.7 G/DL (ref 31–37)
MCV RBC AUTO: 88.5 FL (ref 80–100)
MONOCYTES # BLD AUTO: 0.6 K/UL (ref 0–1)
MONOCYTES NFR BLD AUTO: 7 %
NEUTROPHILS # BLD AUTO: 7.3 K/UL (ref 1.3–7.7)
NEUTROPHILS NFR BLD AUTO: 77 %
PCO2 BLDA: 48 MMHG (ref 35–45)
PH BLDA: 7.25 [PH] (ref 7.35–7.45)
PH UR: 7.5 [PH] (ref 5–8)
PLATELET # BLD AUTO: 250 K/UL (ref 150–450)
PO2 BLDA: 94 MMHG (ref 83–108)
POTASSIUM SERPL-SCNC: 4.4 MMOL/L (ref 3.5–5.1)
PROT SERPL-MCNC: 6.6 G/DL (ref 6.3–8.2)
PT BLD: 19.7 SEC (ref 9–12)
RBC UR QL: 2 /HPF (ref 0–5)
SODIUM SERPL-SCNC: 136 MMOL/L (ref 137–145)
SP GR UR: 1.01 (ref 1–1.03)
SQUAMOUS UR QL AUTO: <1 /HPF (ref 0–4)
TROPONIN I SERPL-MCNC: <0.012 NG/ML (ref 0–0.03)
UROBILINOGEN UR QL STRIP: <2 MG/DL (ref ?–2)
WBC # BLD AUTO: 9.5 K/UL (ref 3.8–10.6)
WBC #/AREA URNS HPF: 156 /HPF (ref 0–5)

## 2018-03-13 RX ADMIN — FLUMAZENIL STA MG: 0.1 INJECTION, SOLUTION INTRAVENOUS at 04:46

## 2018-03-13 RX ADMIN — MONTELUKAST SODIUM SCH MG: 10 TABLET, FILM COATED ORAL at 21:58

## 2018-03-13 RX ADMIN — TOBRAMYCIN SCH: 3 SOLUTION OPHTHALMIC at 05:26

## 2018-03-13 RX ADMIN — GABAPENTIN SCH: 400 CAPSULE ORAL at 10:59

## 2018-03-13 RX ADMIN — GABAPENTIN SCH MG: 400 CAPSULE ORAL at 21:58

## 2018-03-13 RX ADMIN — FLUMAZENIL STA MG: 0.1 INJECTION, SOLUTION INTRAVENOUS at 04:44

## 2018-03-13 RX ADMIN — ATORVASTATIN CALCIUM SCH MG: 40 TABLET, FILM COATED ORAL at 21:58

## 2018-03-13 RX ADMIN — ASPIRIN 81 MG CHEWABLE TABLET SCH: 81 TABLET CHEWABLE at 10:58

## 2018-03-13 RX ADMIN — TOBRAMYCIN SCH DROPS: 3 SOLUTION OPHTHALMIC at 21:58

## 2018-03-13 RX ADMIN — FLUMAZENIL STA MG: 0.1 INJECTION, SOLUTION INTRAVENOUS at 04:59

## 2018-03-13 RX ADMIN — SERTRALINE HYDROCHLORIDE SCH: 100 TABLET ORAL at 10:59

## 2018-03-13 RX ADMIN — TOBRAMYCIN SCH: 3 SOLUTION OPHTHALMIC at 18:31

## 2018-03-13 RX ADMIN — LEVOTHYROXINE SODIUM SCH: 75 TABLET ORAL at 10:59

## 2018-03-13 RX ADMIN — WARFARIN SODIUM SCH MG: 2.5 TABLET ORAL at 21:59

## 2018-03-13 RX ADMIN — TOBRAMYCIN SCH: 3 SOLUTION OPHTHALMIC at 13:49

## 2018-03-13 RX ADMIN — METOPROLOL TARTRATE SCH MG: 25 TABLET, FILM COATED ORAL at 21:58

## 2018-03-13 RX ADMIN — CYCLOBENZAPRINE HYDROCHLORIDE SCH MG: 10 TABLET, FILM COATED ORAL at 21:58

## 2018-03-13 RX ADMIN — FLUMAZENIL STA MG: 0.1 INJECTION, SOLUTION INTRAVENOUS at 04:48

## 2018-03-13 RX ADMIN — METOPROLOL TARTRATE SCH: 25 TABLET, FILM COATED ORAL at 10:59

## 2018-03-13 RX ADMIN — TOBRAMYCIN SCH: 3 SOLUTION OPHTHALMIC at 10:58

## 2018-03-13 NOTE — ED
Altered Mental Status HPI





<Drew Whitney - Last Filed: 18 02:42>





- General


Source: EMS, RN notes reviewed, old records reviewed


Mode of arrival: EMS


Limitations: altered mental status





- History of Present Illness


MD Complaint: altered mental status, confusion





<CuongPaavn - Last Filed: 18 14:29>





- General


Chief Complaint: Altered Mental Status


Stated Complaint: Altered mental status


Time Seen by Provider: 18 23:48





- History of Present Illness


Initial Comments: 





This is a 76-year-old female history of heart disease hyperlipidemia CVA in the 

past who is brought in by EMS with complaints of altered mental status which 

began somewhere around dinnertime evening bleeding around 6 PM.  The patient 

 finally called EMS his prior to arrival because of this activity.  No 

reports of any trauma no fevers chills nausea vomiting sweats rhinorrhea or 

other symptoms reported.  Of note per paramedics patient is on warfarin also 

she had a bottle of Xanax 0.5 mg was filled on  on her 20 tablets 

are missing.  Patient's been agitated minimally responsive.  She has been 

riding demonstrate no focal weakness.  Patient had one prior episode of this 

activity in the past one.  He was diagnosed with being delusional.  No other 

information available at this time (Pavan Hutchins)





- Related Data


 Home Medications











 Medication  Instructions  Recorded  Confirmed


 


Sertraline HCl [Zoloft] 200 mg PO DAILY 02/17/15 03/13/18


 


Levothyroxine Sodium [Synthroid] 75 mcg PO DAILY 04/28/15 03/13/18


 


Montelukast [Singulair] 10 mg PO HS 04/28/15 03/13/18


 


Gabapentin [Neurontin] 1,200 mg PO BID 16


 


Aspirin EC [Ecotrin Low Dose] 81 mg PO DAILY 17


 


Cyclobenzaprine HCl 10 mg PO TID PRN 17


 


ALPRAZolam [Xanax] 0.5 mg PO TID PRN 18


 


Albuterol Inhaler [Ventolin Hfa 1 - 2 puff INHALATION RT-Q6H PRN 18





Inhaler]   


 


Atorvastatin [Lipitor] 80 mg PO HS 18


 


Metoprolol Tartrate [Lopressor] 12.5 mg PO BID 18


 


Tiotropium 18 Mcg/Puff [Spiriva] 1 cap INHALATION RT-DAILY 18


 


Warfarin [Coumadin] 2.5 mg PO AS DIRECTED 18


 


Warfarin [Coumadin] 5 mg PO AS DIRECTED 18











 Allergies











Allergy/AdvReac Type Severity Reaction Status Date / Time


 


iodine Allergy Severe throat Verified 18 09:50





   Swelling  


 


latex Allergy Severe throat Verified 18 09:50





   swelling  


 


adhesive tape Allergy  Unknown Verified 18 09:50


 


aspirin Allergy  Unknown Verified 18 09:50


 


banana Allergy  Unknown Verified 18 23:59


 


Citrus And Derivatives Allergy  Unknown Verified 18 23:59





[Citrus]     


 


codeine Allergy  Nausea & Verified 18 09:50





   Vomiting  


 


hydrocodone Allergy  Unknown Verified 18 09:50


 


Penicillins Allergy  Rash/Hives Verified 18 09:50


 


doxycycline AdvReac  Nausea & Verified 18 09:50





   Vomiting  


 


seafood Allergy  Unknown Uncoded 18 23:59














Review of Systems


ROS Other: All systems not noted in ROS Statement are negative.





<Drew Whitney - Last Filed: 18 02:42>


ROS Other: All systems not noted in ROS Statement are negative.


Limitations: ROS unobtainable due to patients medical condition





<Pavan Hutchins - Last Filed: 18 14:29>


ROS Statement: 


Those systems with pertinent positive or pertinent negative responses have been 

documented in the HPI.








Past Medical History


Past Medical History: Atrial Fibrillation, Asthma, Coronary Artery Disease (CAD)

, Chest Pain / Angina, COPD, CVA/TIA, Hyperlipidemia, Hypertension, Myocardial 

Infarction (MI), Osteoarthritis (OA), Thyroid Disorder, Vascular Disorder


Additional Past Medical History / Comment(s): LUPUS , RIGHT SIDED WEAKNESS and 

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

" spells and often falls


Last Myocardial Infarction Date:: 2012


History of Any Multi-Drug Resistant Organisms: None Reported


Past Surgical History: Appendectomy, Heart Catheterization With Stent, 

Orthopedic Surgery, Tonsillectomy


Additional Past Surgical History / Comment(s): 3 cardiac stents, PTCA   tremaine 

carotid endarterectomy, laparoscopy-adhesions removed. tremaine arthroscopic knee 

surgery, bilateral cataract removal,


Past Anesthesia/Blood Transfusion Reactions: No Reported Reaction


Additional Past Anesthesia/Blood Transfusion Reaction / Comment(s): .


Date of Last Stent Placement:: (not sure month)


Past Psychological History: Anxiety


Smoking Status: Current every day smoker


Past Alcohol Use History: None Reported


Past Drug Use History: None Reported





- Past Family History


  ** Father


Family Medical History: Cancer


Additional Family Medical History / Comment(s): Father had massive MI at age 66 

yrs.  He  from CVA at age 79yrs.





  ** Mother


Family Medical History: CVA/TIA, Deep Vein Thrombosis (DVT), Pulmonary Embolus


Additional Family Medical History / Comment(s): Mother  of CVA at age 79 

yrs.





<Pavan Hutchins - Last Filed: 18 14:29>





General Exam





<Drew Whitney - Last Filed: 18 02:42>


Limitations: altered mental status


General appearance: anxious, lethargic, other


Head exam: Present: atraumatic (Agitated), normocephalic, normal inspection


Eye exam: Present: normal appearance, PERRL, EOMI.  Absent: scleral icterus, 

conjunctival injection, periorbital swelling


Pupils: Present: other (Pupils are equal round and reactive)


ENT exam: Present: mucous membranes dry, other (Patient does demonstrate some 

lip smacking and tongue protrusion)


Neck exam: Present: normal inspection.  Absent: tenderness, meningismus, 

lymphadenopathy


Respiratory exam: Present: normal lung sounds bilaterally.  Absent: respiratory 

distress, wheezes, rales, rhonchi, stridor


Cardiovascular Exam: Present: regular rate, normal rhythm, normal heart sounds.

  Absent: systolic murmur, diastolic murmur, rubs, gallop, clicks


GI/Abdominal exam: Present: soft, normal bowel sounds.  Absent: distended, 

tenderness, guarding, rebound, rigid, bruit, pulsatile mass, hernia


Rectal exam: Present: deferred


Extremities exam: Present: normal inspection, full ROM, normal capillary 

refill.  Absent: tenderness


Back exam: Present: normal inspection


Neurological exam: Present: altered, CN II-XII intact.  Absent: motor sensory 

deficit


Psychiatric exam: Present: agitated


Skin exam: Present: warm, dry, intact, normal color.  Absent: rash





<CuongPavan - Last Filed: 18 14:29>





- General Exam Comments


Initial Comments: 





This is a well-developed asthenic appearing female who is writhing in a 

dystonic type fashion she does somewhat respond to verbal questions commands 

but is not cooperative. (Pavan Hutchins)





Course





<Drew Whitney - Last Filed: 18 02:42>





<Pavan Hutchins - Last Filed: 18 14:29>


 Vital Signs











  18





  23:54 01:01 02:20


 


Temperature 98.7 F  


 


Pulse Rate 74 73 61


 


Respiratory 20 20 20





Rate   


 


Blood Pressure 126/81 154/67 108/55


 


O2 Sat by Pulse 95 95 96





Oximetry   














  18





  03:04 03:52


 


Temperature  


 


Pulse Rate 54 L 54 L


 


Respiratory 20 20





Rate  


 


Blood Pressure 105/53 149/63


 


O2 Sat by Pulse 98 100





Oximetry  








The patient is reassessed at 2:43 AM, M.D., her EKG is a normal normal sinus 

rhythm ventricular rate is 61 TX interval is 172 QRS duration is 82 QT/QTc is 

500/503 and 56 EKG does not reveal any ST elevation or ST depression.  She came 

in with the agitation and confusion there was a question of altered mental 

status now head CT is normal chest x-rays normal troponin is 1. and urine is 

very very a positive family said in the past she had a bad the UTIs in and 

cause her some confusion and the change in mental status, the laboratory in the 

imaging details are discussed with the  and the daughter and they were 

informed that she be admitted to Dr. Lew's service and they're agreeable to 

the (Drew Whitney)





- Reevaluation(s)


Reevaluation #1: 





18 01:01


The patient demonstrates some agitation in spite of Benadryl she will be given 

IV Ativan.  The patient's care will be endorsed to Dr. Whitney at our shift 

change.  He will make the final disposition. (Pavan Hutchins)





Procedures





- Restraint - Face to Face


  ** Restraint Occurrence 1


Patient's Immediate Situation: Endangers self safety


Patient's Reaction to the Intervention: Bizarre, Restless, Resistive to care


Patient's Medical & Behavioral Condition: Awake, Alert, Agitated


Need to Continue or Terminate Restraint or Seclusion: Continue


Face to Face Eval of Restraint Date: 18


Face to Face Eval of Restraint Time: 23:52





<Pavan Hutchins - Last Filed: 18 14:29>





Medical Decision Making





- Lab Data


Result diagrams: 


 18 00:00





 18 00:00





<Drew Whitney - Last Filed: 18 02:42>





- Lab Data


Result diagrams: 


 18 00:00





 18 00:00





<Pavan Hutchins - Last Filed: 18 14:29>





- Lab Data


 Lab Results











  18 Range/Units





  00:00 00:00 00:00 


 


WBC   9.5   (3.8-10.6)  k/uL


 


RBC   4.21   (3.80-5.40)  m/uL


 


Hgb   11.8   (11.4-16.0)  gm/dL


 


Hct   37.2   (34.0-46.0)  %


 


MCV   88.5  D   (80.0-100.0)  fL


 


MCH   28.1   (25.0-35.0)  pg


 


MCHC   31.7   (31.0-37.0)  g/dL


 


RDW   16.2 H   (11.5-15.5)  %


 


Plt Count   250   (150-450)  k/uL


 


Neutrophils %   77   %


 


Lymphocytes %   15   %


 


Monocytes %   7   %


 


Eosinophils %   0   %


 


Basophils %   0   %


 


Neutrophils #   7.3   (1.3-7.7)  k/uL


 


Lymphocytes #   1.4   (1.0-4.8)  k/uL


 


Monocytes #   0.6   (0-1.0)  k/uL


 


Eosinophils #   0.0   (0-0.7)  k/uL


 


Basophils #   0.0   (0-0.2)  k/uL


 


Anisocytosis   Slight   


 


PT     (9.0-12.0)  sec


 


INR     (<1.2)  


 


APTT     (22.0-30.0)  sec


 


Sodium    136 L  (137-145)  mmol/L


 


Potassium    4.4  (3.5-5.1)  mmol/L


 


Chloride    104  ()  mmol/L


 


Carbon Dioxide    19 L  (22-30)  mmol/L


 


Anion Gap    13  mmol/L


 


BUN    19 H  (7-17)  mg/dL


 


Creatinine    1.20 H  (0.52-1.04)  mg/dL


 


Est GFR (CKD-EPI)AfAm    51  (>60 ml/min/1.73 sqM)  


 


Est GFR (CKD-EPI)NonAf    44  (>60 ml/min/1.73 sqM)  


 


Glucose    119 H  (74-99)  mg/dL


 


POC Glucose (mg/dL)     (75-99)  mg/dL


 


POC Glu Operater ID     


 


Plasma Lactic Acid Dudley     (0.7-2.0)  mmol/L


 


Calcium    9.5  (8.4-10.2)  mg/dL


 


Magnesium    1.6  (1.6-2.3)  mg/dL


 


Total Bilirubin    0.4  (0.2-1.3)  mg/dL


 


AST    30  (14-36)  U/L


 


ALT    21  (9-52)  U/L


 


Alkaline Phosphatase    96  ()  U/L


 


Ammonia     (<30)  umol/L


 


Total Creatine Kinase  86    ()  U/L


 


CK-MB (CK-2)  2.1    (0.0-2.4)  ng/mL


 


CK-MB (CK-2) Rel Index  2.4    


 


Troponin I  <0.012    (0.000-0.034)  ng/mL


 


Total Protein    6.6  (6.3-8.2)  g/dL


 


Albumin    3.8  (3.5-5.0)  g/dL


 


Amylase    97  ()  U/L


 


Lipase    125  ()  U/L


 


Urine Color     


 


Urine Appearance     (Clear)  


 


Urine pH     (5.0-8.0)  


 


Ur Specific Gravity     (1.001-1.035)  


 


Urine Protein     (Negative)  


 


Urine Glucose (UA)     (Negative)  


 


Urine Ketones     (Negative)  


 


Urine Blood     (Negative)  


 


Urine Nitrite     (Negative)  


 


Urine Bilirubin     (Negative)  


 


Urine Urobilinogen     (<2.0)  mg/dL


 


Ur Leukocyte Esterase     (Negative)  


 


Urine RBC     (0-5)  /hpf


 


Urine WBC     (0-5)  /hpf


 


Ur Squamous Epith Cells     (0-4)  /hpf


 


Amorphous Sediment     (None)  /hpf


 


Urine Bacteria     (None)  /hpf


 


Hyaline Casts     (0-2)  /lpf


 


Urine Opiates Screen     (NotDetected)  


 


Ur Oxycodone Screen     (NotDetected)  


 


Urine Methadone Screen     (NotDetected)  


 


Ur Propoxyphene Screen     (NotDetected)  


 


Acetaminophen    <10.0  ug/mL


 


Ur Barbiturates Screen     (NotDetected)  


 


U Tricyclic Antidepress     (NotDetected)  


 


Ur Phencyclidine Scrn     (NotDetected)  


 


Ur Amphetamines Screen     (NotDetected)  


 


U Methamphetamines Scrn     (NotDetected)  


 


U Benzodiazepines Scrn     (NotDetected)  


 


Urine Cocaine Screen     (NotDetected)  


 


U Marijuana (THC) Screen     (NotDetected)  


 


Serum Alcohol    <10  mg/dL














  18 Range/Units





  00:00 00:00 00:07 


 


WBC     (3.8-10.6)  k/uL


 


RBC     (3.80-5.40)  m/uL


 


Hgb     (11.4-16.0)  gm/dL


 


Hct     (34.0-46.0)  %


 


MCV     (80.0-100.0)  fL


 


MCH     (25.0-35.0)  pg


 


MCHC     (31.0-37.0)  g/dL


 


RDW     (11.5-15.5)  %


 


Plt Count     (150-450)  k/uL


 


Neutrophils %     %


 


Lymphocytes %     %


 


Monocytes %     %


 


Eosinophils %     %


 


Basophils %     %


 


Neutrophils #     (1.3-7.7)  k/uL


 


Lymphocytes #     (1.0-4.8)  k/uL


 


Monocytes #     (0-1.0)  k/uL


 


Eosinophils #     (0-0.7)  k/uL


 


Basophils #     (0-0.2)  k/uL


 


Anisocytosis     


 


PT  19.7 H    (9.0-12.0)  sec


 


INR  2.2 H    (<1.2)  


 


APTT  29.7    (22.0-30.0)  sec


 


Sodium     (137-145)  mmol/L


 


Potassium     (3.5-5.1)  mmol/L


 


Chloride     ()  mmol/L


 


Carbon Dioxide     (22-30)  mmol/L


 


Anion Gap     mmol/L


 


BUN     (7-17)  mg/dL


 


Creatinine     (0.52-1.04)  mg/dL


 


Est GFR (CKD-EPI)AfAm     (>60 ml/min/1.73 sqM)  


 


Est GFR (CKD-EPI)NonAf     (>60 ml/min/1.73 sqM)  


 


Glucose     (74-99)  mg/dL


 


POC Glucose (mg/dL)    126 H  (75-99)  mg/dL


 


POC Glu Operater ID    Lucinda Smith  


 


Plasma Lactic Acid Dudley   1.2   (0.7-2.0)  mmol/L


 


Calcium     (8.4-10.2)  mg/dL


 


Magnesium     (1.6-2.3)  mg/dL


 


Total Bilirubin     (0.2-1.3)  mg/dL


 


AST     (14-36)  U/L


 


ALT     (9-52)  U/L


 


Alkaline Phosphatase     ()  U/L


 


Ammonia   17   (<30)  umol/L


 


Total Creatine Kinase     ()  U/L


 


CK-MB (CK-2)     (0.0-2.4)  ng/mL


 


CK-MB (CK-2) Rel Index     


 


Troponin I     (0.000-0.034)  ng/mL


 


Total Protein     (6.3-8.2)  g/dL


 


Albumin     (3.5-5.0)  g/dL


 


Amylase     ()  U/L


 


Lipase     ()  U/L


 


Urine Color     


 


Urine Appearance     (Clear)  


 


Urine pH     (5.0-8.0)  


 


Ur Specific Gravity     (1.001-1.035)  


 


Urine Protein     (Negative)  


 


Urine Glucose (UA)     (Negative)  


 


Urine Ketones     (Negative)  


 


Urine Blood     (Negative)  


 


Urine Nitrite     (Negative)  


 


Urine Bilirubin     (Negative)  


 


Urine Urobilinogen     (<2.0)  mg/dL


 


Ur Leukocyte Esterase     (Negative)  


 


Urine RBC     (0-5)  /hpf


 


Urine WBC     (0-5)  /hpf


 


Ur Squamous Epith Cells     (0-4)  /hpf


 


Amorphous Sediment     (None)  /hpf


 


Urine Bacteria     (None)  /hpf


 


Hyaline Casts     (0-2)  /lpf


 


Urine Opiates Screen     (NotDetected)  


 


Ur Oxycodone Screen     (NotDetected)  


 


Urine Methadone Screen     (NotDetected)  


 


Ur Propoxyphene Screen     (NotDetected)  


 


Acetaminophen     ug/mL


 


Ur Barbiturates Screen     (NotDetected)  


 


U Tricyclic Antidepress     (NotDetected)  


 


Ur Phencyclidine Scrn     (NotDetected)  


 


Ur Amphetamines Screen     (NotDetected)  


 


U Methamphetamines Scrn     (NotDetected)  


 


U Benzodiazepines Scrn     (NotDetected)  


 


Urine Cocaine Screen     (NotDetected)  


 


U Marijuana (THC) Screen     (NotDetected)  


 


Serum Alcohol     mg/dL














  18 Range/Units





  01:53 


 


WBC   (3.8-10.6)  k/uL


 


RBC   (3.80-5.40)  m/uL


 


Hgb   (11.4-16.0)  gm/dL


 


Hct   (34.0-46.0)  %


 


MCV   (80.0-100.0)  fL


 


MCH   (25.0-35.0)  pg


 


MCHC   (31.0-37.0)  g/dL


 


RDW   (11.5-15.5)  %


 


Plt Count   (150-450)  k/uL


 


Neutrophils %   %


 


Lymphocytes %   %


 


Monocytes %   %


 


Eosinophils %   %


 


Basophils %   %


 


Neutrophils #   (1.3-7.7)  k/uL


 


Lymphocytes #   (1.0-4.8)  k/uL


 


Monocytes #   (0-1.0)  k/uL


 


Eosinophils #   (0-0.7)  k/uL


 


Basophils #   (0-0.2)  k/uL


 


Anisocytosis   


 


PT   (9.0-12.0)  sec


 


INR   (<1.2)  


 


APTT   (22.0-30.0)  sec


 


Sodium   (137-145)  mmol/L


 


Potassium   (3.5-5.1)  mmol/L


 


Chloride   ()  mmol/L


 


Carbon Dioxide   (22-30)  mmol/L


 


Anion Gap   mmol/L


 


BUN   (7-17)  mg/dL


 


Creatinine   (0.52-1.04)  mg/dL


 


Est GFR (CKD-EPI)AfAm   (>60 ml/min/1.73 sqM)  


 


Est GFR (CKD-EPI)NonAf   (>60 ml/min/1.73 sqM)  


 


Glucose   (74-99)  mg/dL


 


POC Glucose (mg/dL)   (75-99)  mg/dL


 


POC Glu Operater ID   


 


Plasma Lactic Acid Dudley   (0.7-2.0)  mmol/L


 


Calcium   (8.4-10.2)  mg/dL


 


Magnesium   (1.6-2.3)  mg/dL


 


Total Bilirubin   (0.2-1.3)  mg/dL


 


AST   (14-36)  U/L


 


ALT   (9-52)  U/L


 


Alkaline Phosphatase   ()  U/L


 


Ammonia   (<30)  umol/L


 


Total Creatine Kinase   ()  U/L


 


CK-MB (CK-2)   (0.0-2.4)  ng/mL


 


CK-MB (CK-2) Rel Index   


 


Troponin I   (0.000-0.034)  ng/mL


 


Total Protein   (6.3-8.2)  g/dL


 


Albumin   (3.5-5.0)  g/dL


 


Amylase   ()  U/L


 


Lipase   ()  U/L


 


Urine Color  Yellow  


 


Urine Appearance  Cloudy H  (Clear)  


 


Urine pH  7.5  (5.0-8.0)  


 


Ur Specific Gravity  1.014  (1.001-1.035)  


 


Urine Protein  Trace H  (Negative)  


 


Urine Glucose (UA)  Negative  (Negative)  


 


Urine Ketones  Negative  (Negative)  


 


Urine Blood  Negative  (Negative)  


 


Urine Nitrite  Positive H  (Negative)  


 


Urine Bilirubin  Negative  (Negative)  


 


Urine Urobilinogen  <2.0  (<2.0)  mg/dL


 


Ur Leukocyte Esterase  Large H  (Negative)  


 


Urine RBC  2  (0-5)  /hpf


 


Urine WBC  156 H  (0-5)  /hpf


 


Ur Squamous Epith Cells  <1  (0-4)  /hpf


 


Amorphous Sediment  Occasional H  (None)  /hpf


 


Urine Bacteria  Occasional H  (None)  /hpf


 


Hyaline Casts  1  (0-2)  /lpf


 


Urine Opiates Screen  Not Detected  (NotDetected)  


 


Ur Oxycodone Screen  Not Detected  (NotDetected)  


 


Urine Methadone Screen  Not Detected  (NotDetected)  


 


Ur Propoxyphene Screen  Not Detected  (NotDetected)  


 


Acetaminophen   ug/mL


 


Ur Barbiturates Screen  Not Detected  (NotDetected)  


 


U Tricyclic Antidepress  Detected H  (NotDetected)  


 


Ur Phencyclidine Scrn  Not Detected  (NotDetected)  


 


Ur Amphetamines Screen  Not Detected  (NotDetected)  


 


U Methamphetamines Scrn  Not Detected  (NotDetected)  


 


U Benzodiazepines Scrn  Detected H  (NotDetected)  


 


Urine Cocaine Screen  Not Detected  (NotDetected)  


 


U Marijuana (THC) Screen  Not Detected  (NotDetected)  


 


Serum Alcohol   mg/dL














Disposition





<Drew Whitney - Last Filed: 18 02:42>





<Pavan Hutchins - Last Filed: 18 14:29>


Clinical Impression: 


 Change in mental status, UTI (urinary tract infection), Agitation





Disposition: ADMITTED AS IP TO THIS [facility]


Condition: Good

## 2018-03-13 NOTE — XR
EXAMINATION TYPE: XR chest 1V portable

 

DATE OF EXAM: 3/13/2018

 

COMPARISON: 1/18/2018

 

HISTORY: Altered mental status.

 

TECHNIQUE: Single frontal view of the chest is obtained.

 

FINDINGS:  There is no heart failure nor confluent pneumonic infiltrate. Costophrenic angles are herman
r. Thoracic aorta is atheromatous. Exam is limited by supine technique. There are chest leads.

 

IMPRESSION:  No active cardiopulmonary disease. No change.

## 2018-03-13 NOTE — CT
EXAMINATION TYPE: CT brain wo con

 

DATE OF EXAM: 3/13/2018

 

COMPARISON: 1/18/2018

 

HISTORY: AMS

 

CT DLP: 1059.70 mGycm

Automated exposure control for dose reduction was used.

 

FINDINGS: 

There is some cerebral cortical atrophy. There is old left parietal lobe 4 cm cortical infarct. There
 is no midline shift. There is no sign of intracranial hemorrhage. The calvarium appears intact.

 

IMPRESSION: 

THERE IS SOME CEREBRAL ATROPHY AND CHRONIC SMALL VESSEL ISCHEMIA. 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
 NO CHANGE COMPARED TO OLD EXAM.
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
## 2018-03-14 LAB
ALBUMIN SERPL-MCNC: 4 G/DL (ref 3.5–5)
ALP SERPL-CCNC: 111 U/L (ref 38–126)
ALT SERPL-CCNC: 29 U/L (ref 9–52)
ANION GAP SERPL CALC-SCNC: 12 MMOL/L
AST SERPL-CCNC: 34 U/L (ref 14–36)
BASOPHILS # BLD AUTO: 0 K/UL (ref 0–0.2)
BASOPHILS NFR BLD AUTO: 0 %
BUN SERPL-SCNC: 12 MG/DL (ref 7–17)
CALCIUM SPEC-MCNC: 9.1 MG/DL (ref 8.4–10.2)
CHLORIDE SERPL-SCNC: 108 MMOL/L (ref 98–107)
CO2 SERPL-SCNC: 21 MMOL/L (ref 22–30)
EOSINOPHIL # BLD AUTO: 0.1 K/UL (ref 0–0.7)
EOSINOPHIL NFR BLD AUTO: 1 %
ERYTHROCYTE [DISTWIDTH] IN BLOOD BY AUTOMATED COUNT: 4.92 M/UL (ref 3.8–5.4)
ERYTHROCYTE [DISTWIDTH] IN BLOOD: 16.2 % (ref 11.5–15.5)
GLUCOSE SERPL-MCNC: 89 MG/DL (ref 74–99)
HCT VFR BLD AUTO: 44.6 % (ref 34–46)
HGB BLD-MCNC: 14.2 GM/DL (ref 11.4–16)
INR PPP: 2 (ref ?–1.2)
LYMPHOCYTES # SPEC AUTO: 1.9 K/UL (ref 1–4.8)
LYMPHOCYTES NFR SPEC AUTO: 21 %
MCH RBC QN AUTO: 28.8 PG (ref 25–35)
MCHC RBC AUTO-ENTMCNC: 31.8 G/DL (ref 31–37)
MCV RBC AUTO: 90.6 FL (ref 80–100)
MONOCYTES # BLD AUTO: 0.6 K/UL (ref 0–1)
MONOCYTES NFR BLD AUTO: 7 %
NEUTROPHILS # BLD AUTO: 6.2 K/UL (ref 1.3–7.7)
NEUTROPHILS NFR BLD AUTO: 69 %
PLATELET # BLD AUTO: 256 K/UL (ref 150–450)
POTASSIUM SERPL-SCNC: 3.7 MMOL/L (ref 3.5–5.1)
PROT SERPL-MCNC: 7 G/DL (ref 6.3–8.2)
PT BLD: 18.2 SEC (ref 9–12)
SODIUM SERPL-SCNC: 141 MMOL/L (ref 137–145)
WBC # BLD AUTO: 9 K/UL (ref 3.8–10.6)

## 2018-03-14 RX ADMIN — GABAPENTIN SCH MG: 400 CAPSULE ORAL at 07:30

## 2018-03-14 RX ADMIN — METOPROLOL TARTRATE SCH MG: 25 TABLET, FILM COATED ORAL at 20:07

## 2018-03-14 RX ADMIN — MONTELUKAST SODIUM SCH MG: 10 TABLET, FILM COATED ORAL at 20:07

## 2018-03-14 RX ADMIN — ASPIRIN 81 MG CHEWABLE TABLET SCH MG: 81 TABLET CHEWABLE at 07:31

## 2018-03-14 RX ADMIN — TOBRAMYCIN SCH DROPS: 3 SOLUTION OPHTHALMIC at 07:28

## 2018-03-14 RX ADMIN — TOBRAMYCIN SCH DROPS: 3 SOLUTION OPHTHALMIC at 17:14

## 2018-03-14 RX ADMIN — CEFTRIAXONE SODIUM SCH MG: 1 INJECTION, POWDER, FOR SOLUTION INTRAMUSCULAR; INTRAVENOUS at 05:57

## 2018-03-14 RX ADMIN — SERTRALINE HYDROCHLORIDE SCH MG: 100 TABLET ORAL at 07:29

## 2018-03-14 RX ADMIN — ATORVASTATIN CALCIUM SCH MG: 40 TABLET, FILM COATED ORAL at 20:06

## 2018-03-14 RX ADMIN — CYCLOBENZAPRINE HYDROCHLORIDE SCH MG: 10 TABLET, FILM COATED ORAL at 20:07

## 2018-03-14 RX ADMIN — TOBRAMYCIN SCH DROPS: 3 SOLUTION OPHTHALMIC at 03:53

## 2018-03-14 RX ADMIN — LEVOTHYROXINE SODIUM SCH MCG: 75 TABLET ORAL at 07:31

## 2018-03-14 RX ADMIN — TOBRAMYCIN SCH DROPS: 3 SOLUTION OPHTHALMIC at 18:04

## 2018-03-14 RX ADMIN — TOBRAMYCIN SCH: 3 SOLUTION OPHTHALMIC at 00:02

## 2018-03-14 RX ADMIN — METOPROLOL TARTRATE SCH MG: 25 TABLET, FILM COATED ORAL at 07:30

## 2018-03-14 RX ADMIN — WARFARIN SODIUM SCH MG: 2.5 TABLET ORAL at 20:08

## 2018-03-14 RX ADMIN — TOBRAMYCIN SCH: 3 SOLUTION OPHTHALMIC at 21:35

## 2018-03-14 RX ADMIN — GABAPENTIN SCH MG: 400 CAPSULE ORAL at 20:07

## 2018-03-14 NOTE — P.PN
Progress Note - Text


Progress Note Date: 03/14/18





Interval History: Patient is a 77-year-old female who is being seen in follow-

up to a consultation yesterday.  Patient was in the room with her , 

patient was alert and able to respond to all questions today.  She reported 

that she is no longer feeling as confused as she did yesterday.  Patient's 

 reports that she is back to her baseline.  Patient reported that she 

has been sleeping and eating well.  Patient reported no complaints.


Mental Status: Appearance/Attitude: Patient was sitting in hospital bed, in no 

acute distress and made good eye contact and was cooperative.


Behavior: Patient did not exhibit any psychomotor agitation or retardation


Speech/Language: Patient responded to questions with a brief pause, her speech 

was of normal volume and she was coherent


Thought Process: Patient's responses were goal-directed, no evidence of 

tangential or circumstantial speech and no loose associations or flight of 

ideas.


Thought Content: Patient denied any auditory or visual hallucinations no 

delusions or paranoid ideation were elicited.  Patient reported that she was 

sleeping and eating well.  Patient reported that she is no longer feeling as 

confused as she did yesterday.  Patient's  felt that she would return to 

her baseline.


Suicidal/Homicidal Ideation: Patient denied any current suicidal or homicidal 

ideation.


Sensorium/Cognition: Patient was alert and oriented to person, location, 

situation and knew the month and year and her recent and remote memory were not 

formally tested.  Patient was able to respond to questions such as how long she 

has been , she knew and her wedding anniversary was


Mood/Affect: Patient's mood was pleasant and her affect appropriate





Assessment: patient appears to have returned to her baseline functioning, she 

is eating and sleeping well and is no longer confused and is responding to 

questions appropriately.  Patient did report to me that she did have some 

burning on urination for a day or so prior to her admission.  Patient reports 

no complaints of anxiety or depression at this time.





Plan: Patient should continue on Zoloft 200 mg daily, Xanax 0.5 mg in the 

morning, 1 mg in the afternoon and 0.5 mg at bedtime and will follow up with 

Dr. Cheatham as scheduled previously in his office.  I will sign off the case at 

this time.

## 2018-03-15 VITALS
RESPIRATION RATE: 20 BRPM | TEMPERATURE: 98 F | DIASTOLIC BLOOD PRESSURE: 82 MMHG | SYSTOLIC BLOOD PRESSURE: 188 MMHG | HEART RATE: 57 BPM

## 2018-03-15 LAB
ALBUMIN SERPL-MCNC: 3.6 G/DL (ref 3.5–5)
ALP SERPL-CCNC: 92 U/L (ref 38–126)
ALT SERPL-CCNC: 23 U/L (ref 9–52)
ANION GAP SERPL CALC-SCNC: 7 MMOL/L
AST SERPL-CCNC: 29 U/L (ref 14–36)
BASOPHILS # BLD AUTO: 0 K/UL (ref 0–0.2)
BASOPHILS NFR BLD AUTO: 0 %
BUN SERPL-SCNC: 15 MG/DL (ref 7–17)
CALCIUM SPEC-MCNC: 8.7 MG/DL (ref 8.4–10.2)
CHLORIDE SERPL-SCNC: 109 MMOL/L (ref 98–107)
CO2 SERPL-SCNC: 27 MMOL/L (ref 22–30)
EOSINOPHIL # BLD AUTO: 0.1 K/UL (ref 0–0.7)
EOSINOPHIL NFR BLD AUTO: 2 %
ERYTHROCYTE [DISTWIDTH] IN BLOOD BY AUTOMATED COUNT: 4.66 M/UL (ref 3.8–5.4)
ERYTHROCYTE [DISTWIDTH] IN BLOOD: 16.2 % (ref 11.5–15.5)
GLUCOSE SERPL-MCNC: 95 MG/DL (ref 74–99)
HCT VFR BLD AUTO: 42.5 % (ref 34–46)
HGB BLD-MCNC: 13.1 GM/DL (ref 11.4–16)
INR PPP: 1.9 (ref ?–1.2)
LYMPHOCYTES # SPEC AUTO: 2 K/UL (ref 1–4.8)
LYMPHOCYTES NFR SPEC AUTO: 29 %
MCH RBC QN AUTO: 28.2 PG (ref 25–35)
MCHC RBC AUTO-ENTMCNC: 30.9 G/DL (ref 31–37)
MCV RBC AUTO: 91.2 FL (ref 80–100)
MONOCYTES # BLD AUTO: 0.6 K/UL (ref 0–1)
MONOCYTES NFR BLD AUTO: 9 %
NEUTROPHILS # BLD AUTO: 4.1 K/UL (ref 1.3–7.7)
NEUTROPHILS NFR BLD AUTO: 58 %
PLATELET # BLD AUTO: 257 K/UL (ref 150–450)
POTASSIUM SERPL-SCNC: 3.5 MMOL/L (ref 3.5–5.1)
PROT SERPL-MCNC: 6.6 G/DL (ref 6.3–8.2)
PT BLD: 17.2 SEC (ref 9–12)
SODIUM SERPL-SCNC: 143 MMOL/L (ref 137–145)
WBC # BLD AUTO: 7 K/UL (ref 3.8–10.6)

## 2018-03-15 RX ADMIN — ASPIRIN 81 MG CHEWABLE TABLET SCH MG: 81 TABLET CHEWABLE at 09:04

## 2018-03-15 RX ADMIN — TOBRAMYCIN SCH: 3 SOLUTION OPHTHALMIC at 05:09

## 2018-03-15 RX ADMIN — LEVOTHYROXINE SODIUM SCH MCG: 75 TABLET ORAL at 09:05

## 2018-03-15 RX ADMIN — GABAPENTIN SCH MG: 400 CAPSULE ORAL at 09:04

## 2018-03-15 RX ADMIN — SERTRALINE HYDROCHLORIDE SCH MG: 100 TABLET ORAL at 09:05

## 2018-03-15 RX ADMIN — TOBRAMYCIN SCH: 3 SOLUTION OPHTHALMIC at 13:42

## 2018-03-15 RX ADMIN — CEFTRIAXONE SODIUM SCH MG: 1 INJECTION, POWDER, FOR SOLUTION INTRAMUSCULAR; INTRAVENOUS at 05:41

## 2018-03-15 RX ADMIN — TOBRAMYCIN SCH: 3 SOLUTION OPHTHALMIC at 01:01

## 2018-03-15 RX ADMIN — TOBRAMYCIN SCH DROPS: 3 SOLUTION OPHTHALMIC at 09:03

## 2018-03-15 RX ADMIN — METOPROLOL TARTRATE SCH MG: 25 TABLET, FILM COATED ORAL at 09:05
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
## 2018-05-30 ENCOUNTER — HOSPITAL ENCOUNTER (EMERGENCY)
Dept: HOSPITAL 47 - EC | Age: 78
End: 2018-05-30
Payer: MEDICARE

## 2018-05-30 VITALS — TEMPERATURE: 101.3 F

## 2018-05-30 VITALS — SYSTOLIC BLOOD PRESSURE: 108 MMHG | DIASTOLIC BLOOD PRESSURE: 56 MMHG

## 2018-05-30 DIAGNOSIS — Z85.72: ICD-10-CM

## 2018-05-30 DIAGNOSIS — Z79.01: ICD-10-CM

## 2018-05-30 DIAGNOSIS — Z79.51: ICD-10-CM

## 2018-05-30 DIAGNOSIS — G62.9: ICD-10-CM

## 2018-05-30 DIAGNOSIS — Z87.01: ICD-10-CM

## 2018-05-30 DIAGNOSIS — Z88.1: ICD-10-CM

## 2018-05-30 DIAGNOSIS — Z88.5: ICD-10-CM

## 2018-05-30 DIAGNOSIS — E03.9: ICD-10-CM

## 2018-05-30 DIAGNOSIS — F17.200: ICD-10-CM

## 2018-05-30 DIAGNOSIS — I25.2: ICD-10-CM

## 2018-05-30 DIAGNOSIS — Z88.0: ICD-10-CM

## 2018-05-30 DIAGNOSIS — I73.9: ICD-10-CM

## 2018-05-30 DIAGNOSIS — G93.5: ICD-10-CM

## 2018-05-30 DIAGNOSIS — Z91.09: ICD-10-CM

## 2018-05-30 DIAGNOSIS — J44.9: ICD-10-CM

## 2018-05-30 DIAGNOSIS — Z91.018: ICD-10-CM

## 2018-05-30 DIAGNOSIS — Z88.6: ICD-10-CM

## 2018-05-30 DIAGNOSIS — I62.9: Primary | ICD-10-CM

## 2018-05-30 DIAGNOSIS — Z79.899: ICD-10-CM

## 2018-05-30 DIAGNOSIS — Z79.82: ICD-10-CM

## 2018-05-30 DIAGNOSIS — I10: ICD-10-CM

## 2018-05-30 DIAGNOSIS — Z92.21: ICD-10-CM

## 2018-05-30 DIAGNOSIS — I48.91: ICD-10-CM

## 2018-05-30 DIAGNOSIS — I25.10: ICD-10-CM

## 2018-05-30 DIAGNOSIS — R00.0: ICD-10-CM

## 2018-05-30 DIAGNOSIS — Z95.5: ICD-10-CM

## 2018-05-30 DIAGNOSIS — Z91.040: ICD-10-CM

## 2018-05-30 DIAGNOSIS — F41.9: ICD-10-CM

## 2018-05-30 DIAGNOSIS — Z86.79: ICD-10-CM

## 2018-05-30 DIAGNOSIS — Z82.49: ICD-10-CM

## 2018-05-30 DIAGNOSIS — R11.10: ICD-10-CM

## 2018-05-30 LAB
ALBUMIN SERPL-MCNC: 4.1 G/DL (ref 3.5–5)
ALP SERPL-CCNC: 84 U/L (ref 38–126)
ALT SERPL-CCNC: 29 U/L (ref 9–52)
ANION GAP SERPL CALC-SCNC: 16 MMOL/L
APTT BLD: 34.1 SEC (ref 22–30)
AST SERPL-CCNC: 26 U/L (ref 14–36)
BASOPHILS # BLD AUTO: 0 K/UL (ref 0–0.2)
BASOPHILS NFR BLD AUTO: 0 %
BUN SERPL-SCNC: 16 MG/DL (ref 7–17)
CALCIUM SPEC-MCNC: 8.5 MG/DL (ref 8.4–10.2)
CHLORIDE SERPL-SCNC: 101 MMOL/L (ref 98–107)
CK SERPL-CCNC: 30 U/L (ref 30–135)
CO2 BLDA-SCNC: 24 MMOL/L (ref 19–24)
CO2 SERPL-SCNC: 21 MMOL/L (ref 22–30)
EOSINOPHIL # BLD AUTO: 0.1 K/UL (ref 0–0.7)
EOSINOPHIL NFR BLD AUTO: 0 %
ERYTHROCYTE [DISTWIDTH] IN BLOOD BY AUTOMATED COUNT: 3.8 M/UL (ref 3.8–5.4)
ERYTHROCYTE [DISTWIDTH] IN BLOOD: 16.9 % (ref 11.5–15.5)
GLUCOSE SERPL-MCNC: 174 MG/DL (ref 74–99)
HCO3 BLDA-SCNC: 22 MMOL/L (ref 21–25)
HCT VFR BLD AUTO: 35.6 % (ref 34–46)
HGB BLD-MCNC: 11.1 GM/DL (ref 11.4–16)
INR PPP: 3 (ref ?–1.2)
KETONES UR QL STRIP.AUTO: (no result)
LIPASE SERPL-CCNC: 61 U/L (ref 23–300)
LYMPHOCYTES # SPEC AUTO: 1.2 K/UL (ref 1–4.8)
LYMPHOCYTES NFR SPEC AUTO: 5 %
MAGNESIUM SPEC-SCNC: 1.6 MG/DL (ref 1.6–2.3)
MCH RBC QN AUTO: 29.3 PG (ref 25–35)
MCHC RBC AUTO-ENTMCNC: 31.2 G/DL (ref 31–37)
MCV RBC AUTO: 93.9 FL (ref 80–100)
MONOCYTES # BLD AUTO: 2 K/UL (ref 0–1)
MONOCYTES NFR BLD AUTO: 9 %
NEUTROPHILS # BLD AUTO: 17.9 K/UL (ref 1.3–7.7)
NEUTROPHILS NFR BLD AUTO: 84 %
PCO2 BLDA: 51 MMHG (ref 35–45)
PH BLDA: 7.25 [PH] (ref 7.35–7.45)
PH UR: 7 [PH] (ref 5–8)
PLATELET # BLD AUTO: 400 K/UL (ref 150–450)
PO2 BLDA: 227 MMHG (ref 83–108)
POTASSIUM SERPL-SCNC: 3.8 MMOL/L (ref 3.5–5.1)
PROT SERPL-MCNC: 6.8 G/DL (ref 6.3–8.2)
PT BLD: 27.2 SEC (ref 9–12)
SODIUM SERPL-SCNC: 138 MMOL/L (ref 137–145)
SP GR UR: 1.01 (ref 1–1.03)
TROPONIN I SERPL-MCNC: 0.28 NG/ML (ref 0–0.03)
UROBILINOGEN UR QL STRIP: <2 MG/DL (ref ?–2)
WBC # BLD AUTO: 21.3 K/UL (ref 3.8–10.6)

## 2018-05-30 PROCEDURE — 99292 CRITICAL CARE ADDL 30 MIN: CPT

## 2018-05-30 PROCEDURE — 85610 PROTHROMBIN TIME: CPT

## 2018-05-30 PROCEDURE — 94002 VENT MGMT INPAT INIT DAY: CPT

## 2018-05-30 PROCEDURE — 83735 ASSAY OF MAGNESIUM: CPT

## 2018-05-30 PROCEDURE — 84484 ASSAY OF TROPONIN QUANT: CPT

## 2018-05-30 PROCEDURE — 87040 BLOOD CULTURE FOR BACTERIA: CPT

## 2018-05-30 PROCEDURE — 70450 CT HEAD/BRAIN W/O DYE: CPT

## 2018-05-30 PROCEDURE — 82550 ASSAY OF CK (CPK): CPT

## 2018-05-30 PROCEDURE — 96375 TX/PRO/DX INJ NEW DRUG ADDON: CPT

## 2018-05-30 PROCEDURE — 87205 SMEAR GRAM STAIN: CPT

## 2018-05-30 PROCEDURE — 36600 WITHDRAWAL OF ARTERIAL BLOOD: CPT

## 2018-05-30 PROCEDURE — 82805 BLOOD GASES W/O2 SATURATION: CPT

## 2018-05-30 PROCEDURE — 80053 COMPREHEN METABOLIC PANEL: CPT

## 2018-05-30 PROCEDURE — 83605 ASSAY OF LACTIC ACID: CPT

## 2018-05-30 PROCEDURE — 85025 COMPLETE CBC W/AUTO DIFF WBC: CPT

## 2018-05-30 PROCEDURE — 93005 ELECTROCARDIOGRAM TRACING: CPT

## 2018-05-30 PROCEDURE — 83880 ASSAY OF NATRIURETIC PEPTIDE: CPT

## 2018-05-30 PROCEDURE — 87070 CULTURE OTHR SPECIMN AEROBIC: CPT

## 2018-05-30 PROCEDURE — 71045 X-RAY EXAM CHEST 1 VIEW: CPT

## 2018-05-30 PROCEDURE — 96368 THER/DIAG CONCURRENT INF: CPT

## 2018-05-30 PROCEDURE — 31500 INSERT EMERGENCY AIRWAY: CPT

## 2018-05-30 PROCEDURE — 81003 URINALYSIS AUTO W/O SCOPE: CPT

## 2018-05-30 PROCEDURE — 96365 THER/PROPH/DIAG IV INF INIT: CPT

## 2018-05-30 PROCEDURE — 36415 COLL VENOUS BLD VENIPUNCTURE: CPT

## 2018-05-30 PROCEDURE — 85730 THROMBOPLASTIN TIME PARTIAL: CPT

## 2018-05-30 PROCEDURE — 83690 ASSAY OF LIPASE: CPT

## 2018-05-30 PROCEDURE — 82553 CREATINE MB FRACTION: CPT

## 2018-05-30 PROCEDURE — 96366 THER/PROPH/DIAG IV INF ADDON: CPT

## 2018-05-30 PROCEDURE — 99291 CRITICAL CARE FIRST HOUR: CPT

## 2018-05-30 RX ADMIN — MORPHINE SULFATE STA MG: 2 INJECTION, SOLUTION INTRAMUSCULAR; INTRAVENOUS at 10:22

## 2018-05-30 RX ADMIN — MORPHINE SULFATE STA MG: 2 INJECTION, SOLUTION INTRAMUSCULAR; INTRAVENOUS at 10:12

## 2018-05-30 NOTE — CT
EXAMINATION TYPE: CT brain wo con

 

DATE OF EXAM: 5/30/2018

 

COMPARISON: 3/13/2018

 

HISTORY: Unresponsive

 

CT DLP: 1165 mGycm

 

Unenhanced CT of the brain was performed.  

 

There is a large left-sided subdural hematoma the left frontal parietal intraparenchymal components. 
Subdural hematoma extends from the left frontal to the left parietal regions with greatest transverse
 measurement of approximately 2.6 cm. Hemorrhage is also noted within the third ventricle extending i
nto the fourth ventricle. Superimposed subarachnoid component not excluded. Small amount of hemorrhag
e within the interhemispheric fissure.

 

There is left to right shift of approximately 1.4 cm with subfalcine herniation. There is a evidence 
of descending transtentorial herniation.

 

IMPRESSION:

 

1. Large left-sided acute subdural hematoma with intraparenchymal components and probable subarachnoi
d component. There is also hemorrhage within the third and fourth ventricles. 

2 subfalcine herniation with descending transtentorial herniation. Left to right shift as noted.

 

Results discussed with the ER physician at the time of exam completion.

## 2018-05-30 NOTE — XR
EXAMINATION TYPE: XR chest 1V portable

 

DATE OF EXAM: 5/30/2018

 

COMPARISON: Prior chest x-ray 3/13/2015

 

HISTORY: Chest pain

 

TECHNIQUE: Single frontal view of the chest is obtained.

 

FINDINGS:  Endotracheal tube is overlying the tracheal air column, distal tip is at the level of the 
sabina. NG tube is in place, distal tip is within the stomach. No pneumothorax or pleural effusion is
 overlying defibrillator pad overlying cardiac leads. Interstitium is prominent. Pulmonary vascularit
y and cassidy are stable.

 

IMPRESSION:  Interval intubation as described. Report relayed to Dr. Pedro at the time of interpr
etation.
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
